# Patient Record
Sex: MALE | Race: WHITE | Employment: UNEMPLOYED | URBAN - METROPOLITAN AREA
[De-identification: names, ages, dates, MRNs, and addresses within clinical notes are randomized per-mention and may not be internally consistent; named-entity substitution may affect disease eponyms.]

---

## 2017-01-03 ENCOUNTER — HOSPITAL ENCOUNTER (OUTPATIENT)
Dept: CT IMAGING | Age: 57
Discharge: HOME | End: 2017-01-03
Attending: INTERNAL MEDICINE | Admitting: INTERNAL MEDICINE

## 2017-01-03 DIAGNOSIS — J18.9 COMMUNITY ACQUIRED PNEUMONIA: ICD-10-CM

## 2017-01-03 DIAGNOSIS — J18.9 PNEUMONIA: ICD-10-CM

## 2018-04-06 ENCOUNTER — HOSPITAL ENCOUNTER (OUTPATIENT)
Dept: CT IMAGING | Age: 58
Discharge: HOME | End: 2018-04-06
Attending: INTERNAL MEDICINE | Admitting: INTERNAL MEDICINE

## 2018-04-06 VITALS
HEART RATE: 73 BPM | BODY MASS INDEX: 19.6 KG/M2 | OXYGEN SATURATION: 98 % | DIASTOLIC BLOOD PRESSURE: 72 MMHG | TEMPERATURE: 98.1 F | WEIGHT: 140 LBS | HEIGHT: 71 IN | SYSTOLIC BLOOD PRESSURE: 146 MMHG | RESPIRATION RATE: 17 BRPM

## 2018-04-06 DIAGNOSIS — D61.818 PANCYTOPENIA (HCC): ICD-10-CM

## 2018-04-06 DIAGNOSIS — D61.818 OTHER PANCYTOPENIA (HCC): ICD-10-CM

## 2018-04-06 RX ORDER — MIDAZOLAM HYDROCHLORIDE 1 MG/ML
1 INJECTION INTRAMUSCULAR; INTRAVENOUS EVERY 5 MIN PRN
Status: DISCONTINUED | OUTPATIENT
Start: 2018-04-06 | End: 2018-04-07 | Stop reason: HOSPADM

## 2018-04-06 RX ORDER — FENTANYL CITRATE 50 UG/ML
50 INJECTION, SOLUTION INTRAMUSCULAR; INTRAVENOUS EVERY 5 MIN PRN
Status: DISCONTINUED | OUTPATIENT
Start: 2018-04-06 | End: 2018-04-07 | Stop reason: HOSPADM

## 2018-04-06 ASSESSMENT — PAIN SCALES - GENERAL
PAINLEVEL_OUTOF10: 0
PAINLEVEL_OUTOF10: 0

## 2018-04-06 ASSESSMENT — PAIN DESCRIPTION - PAIN TYPE: TYPE: SURGICAL PAIN

## 2018-11-20 ENCOUNTER — HOSPITAL ENCOUNTER (OUTPATIENT)
Dept: ULTRASOUND IMAGING | Age: 58
Discharge: HOME | End: 2018-11-20
Payer: COMMERCIAL

## 2018-11-20 DIAGNOSIS — B18.2 HEP C W/O COMA, CHRONIC (HCC): ICD-10-CM

## 2018-11-20 PROCEDURE — 76705 ECHO EXAM OF ABDOMEN: CPT

## 2019-10-30 ENCOUNTER — HOSPITAL ENCOUNTER (INPATIENT)
Age: 59
LOS: 1 days | Discharge: HOME | DRG: 143 | End: 2019-11-01
Attending: EMERGENCY MEDICINE | Admitting: INTERNAL MEDICINE
Payer: COMMERCIAL

## 2019-10-30 ENCOUNTER — APPOINTMENT (OUTPATIENT)
Dept: CT IMAGING | Age: 59
DRG: 143 | End: 2019-10-30
Payer: COMMERCIAL

## 2019-10-30 ENCOUNTER — APPOINTMENT (OUTPATIENT)
Dept: GENERAL RADIOLOGY | Age: 59
DRG: 143 | End: 2019-10-30
Payer: COMMERCIAL

## 2019-10-30 DIAGNOSIS — J90 PLEURAL EFFUSION ON RIGHT: Primary | ICD-10-CM

## 2019-10-30 DIAGNOSIS — D69.6 THROMBOCYTOPENIA (HCC): ICD-10-CM

## 2019-10-30 DIAGNOSIS — D72.819 LEUKOPENIA, UNSPECIFIED TYPE: ICD-10-CM

## 2019-10-30 LAB
ANION GAP SERPL CALCULATED.3IONS-SCNC: 8 MMOL/L (ref 3–16)
BASE EXCESS VENOUS: 1 (ref -3–3)
BASOPHILS ABSOLUTE: 0 K/UL (ref 0–0.2)
BASOPHILS RELATIVE PERCENT: 0.9 %
BUN BLDV-MCNC: 9 MG/DL (ref 7–20)
CALCIUM SERPL-MCNC: 8 MG/DL (ref 8.3–10.6)
CHLORIDE BLD-SCNC: 103 MMOL/L (ref 99–110)
CO2: 24 MMOL/L (ref 21–32)
CREAT SERPL-MCNC: 0.9 MG/DL (ref 0.9–1.3)
EOSINOPHILS ABSOLUTE: 0.1 K/UL (ref 0–0.6)
EOSINOPHILS RELATIVE PERCENT: 3.7 %
GFR AFRICAN AMERICAN: >60
GFR NON-AFRICAN AMERICAN: >60
GLUCOSE BLD-MCNC: 138 MG/DL (ref 70–99)
HCO3 VENOUS: 25.1 MMOL/L (ref 23–29)
HCT VFR BLD CALC: 32 % (ref 40.5–52.5)
HEMOGLOBIN: 10.8 G/DL (ref 13.5–17.5)
LACTATE: 0.93 MMOL/L (ref 0.4–2)
LYMPHOCYTES ABSOLUTE: 0.9 K/UL (ref 1–5.1)
LYMPHOCYTES RELATIVE PERCENT: 55 %
MCH RBC QN AUTO: 34.1 PG (ref 26–34)
MCHC RBC AUTO-ENTMCNC: 33.8 G/DL (ref 31–36)
MCV RBC AUTO: 101.1 FL (ref 80–100)
MONOCYTES ABSOLUTE: 0.3 K/UL (ref 0–1.3)
MONOCYTES RELATIVE PERCENT: 16.1 %
NEUTROPHILS ABSOLUTE: 0.4 K/UL (ref 1.7–7.7)
NEUTROPHILS RELATIVE PERCENT: 24.3 %
O2 SAT, VEN: 48 %
PCO2, VEN: 36.9 MM HG (ref 40–50)
PDW BLD-RTO: 15.7 % (ref 12.4–15.4)
PERFORMED ON: ABNORMAL
PH VENOUS: 7.44 (ref 7.35–7.45)
PLATELET # BLD: 72 K/UL (ref 135–450)
PMV BLD AUTO: 8.1 FL (ref 5–10.5)
PO2, VEN: 25 MM HG
POC SAMPLE TYPE: ABNORMAL
POTASSIUM SERPL-SCNC: 4.1 MMOL/L (ref 3.5–5.1)
PRO-BNP: 125 PG/ML (ref 0–124)
RAPID INFLUENZA  B AGN: NEGATIVE
RAPID INFLUENZA A AGN: NEGATIVE
RBC # BLD: 3.16 M/UL (ref 4.2–5.9)
SODIUM BLD-SCNC: 135 MMOL/L (ref 136–145)
TCO2 CALC VENOUS: 26 MMOL/L
TROPONIN: <0.01 NG/ML
WBC # BLD: 1.7 K/UL (ref 4–11)

## 2019-10-30 PROCEDURE — 82803 BLOOD GASES ANY COMBINATION: CPT

## 2019-10-30 PROCEDURE — 83880 ASSAY OF NATRIURETIC PEPTIDE: CPT

## 2019-10-30 PROCEDURE — 99285 EMERGENCY DEPT VISIT HI MDM: CPT

## 2019-10-30 PROCEDURE — 93005 ELECTROCARDIOGRAM TRACING: CPT | Performed by: EMERGENCY MEDICINE

## 2019-10-30 PROCEDURE — 71250 CT THORAX DX C-: CPT

## 2019-10-30 PROCEDURE — 80048 BASIC METABOLIC PNL TOTAL CA: CPT

## 2019-10-30 PROCEDURE — 83605 ASSAY OF LACTIC ACID: CPT

## 2019-10-30 PROCEDURE — 36415 COLL VENOUS BLD VENIPUNCTURE: CPT

## 2019-10-30 PROCEDURE — 80076 HEPATIC FUNCTION PANEL: CPT

## 2019-10-30 PROCEDURE — 87804 INFLUENZA ASSAY W/OPTIC: CPT

## 2019-10-30 PROCEDURE — 84484 ASSAY OF TROPONIN QUANT: CPT

## 2019-10-30 PROCEDURE — 85025 COMPLETE CBC W/AUTO DIFF WBC: CPT

## 2019-10-30 PROCEDURE — 71046 X-RAY EXAM CHEST 2 VIEWS: CPT

## 2019-10-30 ASSESSMENT — ENCOUNTER SYMPTOMS
EYES NEGATIVE: 1
COUGH: 1
GASTROINTESTINAL NEGATIVE: 1
SHORTNESS OF BREATH: 1

## 2019-10-31 ENCOUNTER — APPOINTMENT (OUTPATIENT)
Dept: GENERAL RADIOLOGY | Age: 59
DRG: 143 | End: 2019-10-31
Payer: COMMERCIAL

## 2019-10-31 PROBLEM — R06.02 SHORTNESS OF BREATH: Status: ACTIVE | Noted: 2019-10-31

## 2019-10-31 LAB
ALBUMIN SERPL-MCNC: 2.3 G/DL (ref 3.4–5)
ALP BLD-CCNC: 114 U/L (ref 40–129)
ALT SERPL-CCNC: 29 U/L (ref 10–40)
ANION GAP SERPL CALCULATED.3IONS-SCNC: 8 MMOL/L (ref 3–16)
APPEARANCE FLUID: CLEAR
AST SERPL-CCNC: 65 U/L (ref 15–37)
BASOPHILS ABSOLUTE: 0 K/UL (ref 0–0.2)
BASOPHILS RELATIVE PERCENT: 0.6 %
BILIRUB SERPL-MCNC: 1 MG/DL (ref 0–1)
BILIRUBIN DIRECT: 0.4 MG/DL (ref 0–0.3)
BILIRUBIN, INDIRECT: 0.6 MG/DL (ref 0–1)
BUN BLDV-MCNC: 9 MG/DL (ref 7–20)
CALCIUM SERPL-MCNC: 8.3 MG/DL (ref 8.3–10.6)
CELL COUNT FLUID TYPE: NORMAL
CHLORIDE BLD-SCNC: 105 MMOL/L (ref 99–110)
CLOT EVALUATION: NORMAL
CO2: 24 MMOL/L (ref 21–32)
COLOR FLUID: YELLOW
CREAT SERPL-MCNC: 0.9 MG/DL (ref 0.9–1.3)
EKG ATRIAL RATE: 84 BPM
EKG DIAGNOSIS: NORMAL
EKG P AXIS: 10 DEGREES
EKG P-R INTERVAL: 130 MS
EKG Q-T INTERVAL: 346 MS
EKG QRS DURATION: 74 MS
EKG QTC CALCULATION (BAZETT): 408 MS
EKG R AXIS: 20 DEGREES
EKG T AXIS: 14 DEGREES
EKG VENTRICULAR RATE: 84 BPM
EOSINOPHILS ABSOLUTE: 0 K/UL (ref 0–0.6)
EOSINOPHILS RELATIVE PERCENT: 2.8 %
FLUID PATH CONSULT: YES
FLUID TYPE: NORMAL
GFR AFRICAN AMERICAN: >60
GFR NON-AFRICAN AMERICAN: >60
GLUCOSE BLD-MCNC: 154 MG/DL (ref 70–99)
GLUCOSE, FLUID: 154 MG/DL
HCT VFR BLD CALC: 31.3 % (ref 40.5–52.5)
HCT VFR BLD CALC: 33.3 % (ref 40.5–52.5)
HEMOGLOBIN: 11.3 G/DL (ref 13.5–17.5)
IMMATURE RETIC FRACT: 0.46 (ref 0.21–0.37)
LACTATE DEHYDROGENASE: 235 U/L (ref 100–190)
LD, FLUID: 75 U/L
LV EF: 65 %
LVEF MODALITY: NORMAL
LYMPHOCYTES ABSOLUTE: 0.9 K/UL (ref 1–5.1)
LYMPHOCYTES RELATIVE PERCENT: 57.3 %
LYMPHOCYTES, BODY FLUID: 15 %
MCH RBC QN AUTO: 34.3 PG (ref 26–34)
MCHC RBC AUTO-ENTMCNC: 33.9 G/DL (ref 31–36)
MCV RBC AUTO: 101.2 FL (ref 80–100)
MESOTHELIAL FLUID: 29 %
MONOCYTE, FLUID: 52 %
MONOCYTES ABSOLUTE: 0.2 K/UL (ref 0–1.3)
MONOCYTES RELATIVE PERCENT: 12.3 %
NEUTROPHIL, FLUID: 4 %
NEUTROPHILS ABSOLUTE: 0.4 K/UL (ref 1.7–7.7)
NEUTROPHILS RELATIVE PERCENT: 27 %
NUCLEATED CELLS FLUID: 253 /CUMM
NUMBER OF CELLS COUNTED FLUID: 100
PDW BLD-RTO: 15.9 % (ref 12.4–15.4)
PLATELET # BLD: 71 K/UL (ref 135–450)
PMV BLD AUTO: 8.2 FL (ref 5–10.5)
POTASSIUM REFLEX MAGNESIUM: 4.1 MMOL/L (ref 3.5–5.1)
PROTEIN FLUID: 1.6 G/DL
RBC # BLD: 3.29 M/UL (ref 4.2–5.9)
RBC FLUID: 100 /CUMM
RETICULOCYTE ABSOLUTE COUNT: 0.05 M/UL
RETICULOCYTE COUNT PCT: 1.62 % (ref 0.5–2.18)
SODIUM BLD-SCNC: 137 MMOL/L (ref 136–145)
TOTAL PROTEIN: 6.7 G/DL (ref 6.4–8.2)
WBC # BLD: 1.5 K/UL (ref 4–11)

## 2019-10-31 PROCEDURE — 96374 THER/PROPH/DIAG INJ IV PUSH: CPT

## 2019-10-31 PROCEDURE — 85025 COMPLETE CBC W/AUTO DIFF WBC: CPT

## 2019-10-31 PROCEDURE — 84157 ASSAY OF PROTEIN OTHER: CPT

## 2019-10-31 PROCEDURE — 88112 CYTOPATH CELL ENHANCE TECH: CPT

## 2019-10-31 PROCEDURE — 80048 BASIC METABOLIC PNL TOTAL CA: CPT

## 2019-10-31 PROCEDURE — 82747 ASSAY OF FOLIC ACID RBC: CPT

## 2019-10-31 PROCEDURE — 2580000003 HC RX 258: Performed by: INTERNAL MEDICINE

## 2019-10-31 PROCEDURE — 0W993ZZ DRAINAGE OF RIGHT PLEURAL CAVITY, PERCUTANEOUS APPROACH: ICD-10-PCS | Performed by: INTERNAL MEDICINE

## 2019-10-31 PROCEDURE — 6370000000 HC RX 637 (ALT 250 FOR IP): Performed by: INTERNAL MEDICINE

## 2019-10-31 PROCEDURE — 2580000003 HC RX 258: Performed by: EMERGENCY MEDICINE

## 2019-10-31 PROCEDURE — 1200000000 HC SEMI PRIVATE

## 2019-10-31 PROCEDURE — 36415 COLL VENOUS BLD VENIPUNCTURE: CPT

## 2019-10-31 PROCEDURE — 85045 AUTOMATED RETICULOCYTE COUNT: CPT

## 2019-10-31 PROCEDURE — 32557 INSERT CATH PLEURA W/ IMAGE: CPT | Performed by: INTERNAL MEDICINE

## 2019-10-31 PROCEDURE — 71045 X-RAY EXAM CHEST 1 VIEW: CPT

## 2019-10-31 PROCEDURE — 99223 1ST HOSP IP/OBS HIGH 75: CPT | Performed by: INTERNAL MEDICINE

## 2019-10-31 PROCEDURE — 6360000002 HC RX W HCPCS: Performed by: INTERNAL MEDICINE

## 2019-10-31 PROCEDURE — 83921 ORGANIC ACID SINGLE QUANT: CPT

## 2019-10-31 PROCEDURE — 82945 GLUCOSE OTHER FLUID: CPT

## 2019-10-31 PROCEDURE — 93306 TTE W/DOPPLER COMPLETE: CPT

## 2019-10-31 PROCEDURE — 88305 TISSUE EXAM BY PATHOLOGIST: CPT

## 2019-10-31 PROCEDURE — 6360000002 HC RX W HCPCS: Performed by: EMERGENCY MEDICINE

## 2019-10-31 PROCEDURE — 89051 BODY FLUID CELL COUNT: CPT

## 2019-10-31 PROCEDURE — 87040 BLOOD CULTURE FOR BACTERIA: CPT

## 2019-10-31 PROCEDURE — 83615 LACTATE (LD) (LDH) ENZYME: CPT

## 2019-10-31 PROCEDURE — 6370000000 HC RX 637 (ALT 250 FOR IP): Performed by: EMERGENCY MEDICINE

## 2019-10-31 PROCEDURE — 94640 AIRWAY INHALATION TREATMENT: CPT

## 2019-10-31 RX ORDER — ONDANSETRON 2 MG/ML
4 INJECTION INTRAMUSCULAR; INTRAVENOUS EVERY 6 HOURS PRN
Status: DISCONTINUED | OUTPATIENT
Start: 2019-10-31 | End: 2019-11-01 | Stop reason: HOSPADM

## 2019-10-31 RX ORDER — METHADONE HYDROCHLORIDE 10 MG/1
10 TABLET ORAL 2 TIMES DAILY
Status: DISCONTINUED | OUTPATIENT
Start: 2019-10-31 | End: 2019-11-01 | Stop reason: HOSPADM

## 2019-10-31 RX ORDER — ASPIRIN 81 MG/1
81 TABLET, CHEWABLE ORAL DAILY
Status: DISCONTINUED | OUTPATIENT
Start: 2019-10-31 | End: 2019-11-01 | Stop reason: HOSPADM

## 2019-10-31 RX ORDER — ALBUTEROL SULFATE 2.5 MG/3ML
2.5 SOLUTION RESPIRATORY (INHALATION) EVERY 4 HOURS PRN
Status: DISCONTINUED | OUTPATIENT
Start: 2019-10-31 | End: 2019-11-01 | Stop reason: HOSPADM

## 2019-10-31 RX ORDER — LISINOPRIL 40 MG/1
40 TABLET ORAL DAILY
Status: DISCONTINUED | OUTPATIENT
Start: 2019-10-31 | End: 2019-11-01 | Stop reason: HOSPADM

## 2019-10-31 RX ORDER — METHADONE HYDROCHLORIDE 10 MG/1
10 TABLET ORAL ONCE
Status: COMPLETED | OUTPATIENT
Start: 2019-10-31 | End: 2019-10-31

## 2019-10-31 RX ORDER — SENNA AND DOCUSATE SODIUM 50; 8.6 MG/1; MG/1
1 TABLET, FILM COATED ORAL DAILY
Status: DISCONTINUED | OUTPATIENT
Start: 2019-10-31 | End: 2019-11-01 | Stop reason: HOSPADM

## 2019-10-31 RX ORDER — OXYCODONE AND ACETAMINOPHEN 10; 325 MG/1; MG/1
1 TABLET ORAL EVERY 8 HOURS PRN
Status: DISCONTINUED | OUTPATIENT
Start: 2019-10-31 | End: 2019-11-01 | Stop reason: HOSPADM

## 2019-10-31 RX ORDER — GABAPENTIN 300 MG/1
300 CAPSULE ORAL 2 TIMES DAILY
Status: DISCONTINUED | OUTPATIENT
Start: 2019-10-31 | End: 2019-11-01 | Stop reason: HOSPADM

## 2019-10-31 RX ORDER — SODIUM CHLORIDE 0.9 % (FLUSH) 0.9 %
10 SYRINGE (ML) INJECTION EVERY 12 HOURS SCHEDULED
Status: DISCONTINUED | OUTPATIENT
Start: 2019-10-31 | End: 2019-11-01 | Stop reason: HOSPADM

## 2019-10-31 RX ORDER — ALBUTEROL SULFATE 2.5 MG/3ML
2.5 SOLUTION RESPIRATORY (INHALATION)
Status: DISCONTINUED | OUTPATIENT
Start: 2019-10-31 | End: 2019-10-31

## 2019-10-31 RX ORDER — OXYCODONE HYDROCHLORIDE 5 MG/1
10 TABLET ORAL ONCE
Status: COMPLETED | OUTPATIENT
Start: 2019-10-31 | End: 2019-10-31

## 2019-10-31 RX ORDER — SODIUM CHLORIDE 0.9 % (FLUSH) 0.9 %
10 SYRINGE (ML) INJECTION PRN
Status: DISCONTINUED | OUTPATIENT
Start: 2019-10-31 | End: 2019-11-01 | Stop reason: HOSPADM

## 2019-10-31 RX ORDER — IPRATROPIUM BROMIDE AND ALBUTEROL SULFATE 2.5; .5 MG/3ML; MG/3ML
1 SOLUTION RESPIRATORY (INHALATION)
Status: DISCONTINUED | OUTPATIENT
Start: 2019-10-31 | End: 2019-10-31

## 2019-10-31 RX ORDER — AMLODIPINE BESYLATE 2.5 MG/1
2.5 TABLET ORAL DAILY
Status: DISCONTINUED | OUTPATIENT
Start: 2019-10-31 | End: 2019-11-01 | Stop reason: HOSPADM

## 2019-10-31 RX ADMIN — METHADONE HYDROCHLORIDE 10 MG: 10 TABLET ORAL at 08:49

## 2019-10-31 RX ADMIN — METHADONE HYDROCHLORIDE 10 MG: 10 TABLET ORAL at 20:04

## 2019-10-31 RX ADMIN — AZITHROMYCIN MONOHYDRATE 500 MG: 500 INJECTION, POWDER, LYOPHILIZED, FOR SOLUTION INTRAVENOUS at 02:12

## 2019-10-31 RX ADMIN — GABAPENTIN 300 MG: 300 CAPSULE ORAL at 08:49

## 2019-10-31 RX ADMIN — OXYCODONE HYDROCHLORIDE AND ACETAMINOPHEN 1 TABLET: 10; 325 TABLET ORAL at 14:35

## 2019-10-31 RX ADMIN — GABAPENTIN 300 MG: 300 CAPSULE ORAL at 20:04

## 2019-10-31 RX ADMIN — AMLODIPINE BESYLATE 2.5 MG: 2.5 TABLET ORAL at 08:49

## 2019-10-31 RX ADMIN — ALBUTEROL SULFATE 2.5 MG: 2.5 SOLUTION RESPIRATORY (INHALATION) at 03:50

## 2019-10-31 RX ADMIN — LISINOPRIL 40 MG: 40 TABLET ORAL at 08:49

## 2019-10-31 RX ADMIN — ASPIRIN 81 MG 81 MG: 81 TABLET ORAL at 08:49

## 2019-10-31 RX ADMIN — SENNOSIDES-DOCUSATE SODIUM TAB 8.6-50 MG 1 TABLET: 8.6-5 TAB at 08:49

## 2019-10-31 RX ADMIN — Medication 10 ML: at 20:05

## 2019-10-31 RX ADMIN — METHADONE HYDROCHLORIDE 10 MG: 10 TABLET ORAL at 01:08

## 2019-10-31 RX ADMIN — CEFTRIAXONE 1 G: 1 INJECTION, POWDER, FOR SOLUTION INTRAMUSCULAR; INTRAVENOUS at 01:37

## 2019-10-31 RX ADMIN — Medication 10 ML: at 08:49

## 2019-10-31 RX ADMIN — OXYCODONE HYDROCHLORIDE 10 MG: 5 TABLET ORAL at 00:18

## 2019-10-31 ASSESSMENT — PAIN - FUNCTIONAL ASSESSMENT
PAIN_FUNCTIONAL_ASSESSMENT: PREVENTS OR INTERFERES SOME ACTIVE ACTIVITIES AND ADLS
PAIN_FUNCTIONAL_ASSESSMENT: PREVENTS OR INTERFERES SOME ACTIVE ACTIVITIES AND ADLS

## 2019-10-31 ASSESSMENT — PAIN DESCRIPTION - ONSET
ONSET: ON-GOING

## 2019-10-31 ASSESSMENT — PAIN SCALES - GENERAL
PAINLEVEL_OUTOF10: 6
PAINLEVEL_OUTOF10: 4
PAINLEVEL_OUTOF10: 7
PAINLEVEL_OUTOF10: 5
PAINLEVEL_OUTOF10: 5
PAINLEVEL_OUTOF10: 6
PAINLEVEL_OUTOF10: 6
PAINLEVEL_OUTOF10: 4

## 2019-10-31 ASSESSMENT — PAIN DESCRIPTION - FREQUENCY
FREQUENCY: CONTINUOUS

## 2019-10-31 ASSESSMENT — PAIN DESCRIPTION - PAIN TYPE
TYPE: CHRONIC PAIN

## 2019-10-31 ASSESSMENT — ENCOUNTER SYMPTOMS
SHORTNESS OF BREATH: 1
COUGH: 1
CHEST TIGHTNESS: 0
WHEEZING: 0

## 2019-10-31 ASSESSMENT — PAIN DESCRIPTION - LOCATION
LOCATION: BACK
LOCATION: BACK;LEG
LOCATION: BACK;LEG
LOCATION: LEG

## 2019-10-31 ASSESSMENT — PAIN DESCRIPTION - ORIENTATION
ORIENTATION: RIGHT;LEFT
ORIENTATION: LOWER
ORIENTATION: LOWER;LEFT
ORIENTATION: RIGHT;LEFT

## 2019-10-31 ASSESSMENT — PAIN DESCRIPTION - PROGRESSION
CLINICAL_PROGRESSION: NOT CHANGED

## 2019-10-31 ASSESSMENT — PAIN DESCRIPTION - DESCRIPTORS
DESCRIPTORS: ACHING;SHARP
DESCRIPTORS: ACHING

## 2019-11-01 VITALS
WEIGHT: 169.75 LBS | TEMPERATURE: 97.1 F | RESPIRATION RATE: 18 BRPM | SYSTOLIC BLOOD PRESSURE: 110 MMHG | HEART RATE: 64 BPM | BODY MASS INDEX: 23.77 KG/M2 | HEIGHT: 71 IN | OXYGEN SATURATION: 95 % | DIASTOLIC BLOOD PRESSURE: 65 MMHG

## 2019-11-01 DIAGNOSIS — R91.8 GROUND GLASS OPACITY PRESENT ON IMAGING OF LUNG: Primary | ICD-10-CM

## 2019-11-01 PROBLEM — K74.60 CIRRHOSIS (HCC): Status: ACTIVE | Noted: 2019-11-01

## 2019-11-01 PROBLEM — J90 PLEURAL EFFUSION: Status: ACTIVE | Noted: 2019-11-01

## 2019-11-01 LAB
ALBUMIN SERPL-MCNC: 2 G/DL (ref 3.4–5)
ALP BLD-CCNC: 94 U/L (ref 40–129)
ALT SERPL-CCNC: 25 U/L (ref 10–40)
ANION GAP SERPL CALCULATED.3IONS-SCNC: 4 MMOL/L (ref 3–16)
AST SERPL-CCNC: 52 U/L (ref 15–37)
ATYPICAL LYMPHOCYTE RELATIVE PERCENT: 4 % (ref 0–6)
BANDED NEUTROPHILS RELATIVE PERCENT: 1 % (ref 0–7)
BASOPHILS ABSOLUTE: 0 K/UL (ref 0–0.2)
BASOPHILS RELATIVE PERCENT: 1 %
BILIRUB SERPL-MCNC: 0.8 MG/DL (ref 0–1)
BILIRUBIN DIRECT: 0.3 MG/DL (ref 0–0.3)
BILIRUBIN, INDIRECT: 0.5 MG/DL (ref 0–1)
BUN BLDV-MCNC: 11 MG/DL (ref 7–20)
CALCIUM SERPL-MCNC: 7.8 MG/DL (ref 8.3–10.6)
CHLORIDE BLD-SCNC: 108 MMOL/L (ref 99–110)
CO2: 26 MMOL/L (ref 21–32)
CREAT SERPL-MCNC: 0.9 MG/DL (ref 0.9–1.3)
EOSINOPHILS ABSOLUTE: 0.1 K/UL (ref 0–0.6)
EOSINOPHILS RELATIVE PERCENT: 5 %
GFR AFRICAN AMERICAN: >60
GFR NON-AFRICAN AMERICAN: >60
GLUCOSE BLD-MCNC: 82 MG/DL (ref 70–99)
HCT VFR BLD CALC: 30.6 % (ref 40.5–52.5)
HCT VFR BLD CALC: 37.4 %
HEMOGLOBIN: 10.1 G/DL (ref 13.5–17.5)
LYMPHOCYTES ABSOLUTE: 0.8 K/UL (ref 1–5.1)
LYMPHOCYTES RELATIVE PERCENT: 47 %
MCH RBC QN AUTO: 33.4 PG (ref 26–34)
MCHC RBC AUTO-ENTMCNC: 33 G/DL (ref 31–36)
MCV RBC AUTO: 101.3 FL (ref 80–100)
MONOCYTES ABSOLUTE: 0.2 K/UL (ref 0–1.3)
MONOCYTES RELATIVE PERCENT: 12 %
NEUTROPHILS ABSOLUTE: 0.5 K/UL (ref 1.7–7.7)
NEUTROPHILS RELATIVE PERCENT: 30 %
PDW BLD-RTO: 16 % (ref 12.4–15.4)
PLATELET # BLD: 68 K/UL (ref 135–450)
PLATELET SLIDE REVIEW: ABNORMAL
PMV BLD AUTO: 8.2 FL (ref 5–10.5)
POTASSIUM REFLEX MAGNESIUM: 4.2 MMOL/L (ref 3.5–5.1)
RBC # BLD: 3.02 M/UL (ref 4.2–5.9)
RBC FOLATE: 963 NG/ML
SODIUM BLD-SCNC: 138 MMOL/L (ref 136–145)
TOTAL PROTEIN: 6 G/DL (ref 6.4–8.2)
WBC # BLD: 1.6 K/UL (ref 4–11)

## 2019-11-01 PROCEDURE — 80076 HEPATIC FUNCTION PANEL: CPT

## 2019-11-01 PROCEDURE — 6370000000 HC RX 637 (ALT 250 FOR IP): Performed by: INTERNAL MEDICINE

## 2019-11-01 PROCEDURE — 85025 COMPLETE CBC W/AUTO DIFF WBC: CPT

## 2019-11-01 PROCEDURE — 80048 BASIC METABOLIC PNL TOTAL CA: CPT

## 2019-11-01 PROCEDURE — 2580000003 HC RX 258: Performed by: INTERNAL MEDICINE

## 2019-11-01 PROCEDURE — 6360000002 HC RX W HCPCS: Performed by: INTERNAL MEDICINE

## 2019-11-01 PROCEDURE — 36415 COLL VENOUS BLD VENIPUNCTURE: CPT

## 2019-11-01 PROCEDURE — 99233 SBSQ HOSP IP/OBS HIGH 50: CPT | Performed by: INTERNAL MEDICINE

## 2019-11-01 RX ORDER — SPIRONOLACTONE 100 MG/1
100 TABLET, FILM COATED ORAL DAILY
Qty: 30 TABLET | Refills: 3 | Status: ON HOLD | OUTPATIENT
Start: 2019-11-02 | End: 2019-12-04

## 2019-11-01 RX ORDER — FUROSEMIDE 40 MG/1
40 TABLET ORAL DAILY
Qty: 60 TABLET | Refills: 3 | Status: ON HOLD | OUTPATIENT
Start: 2019-11-02 | End: 2019-12-04

## 2019-11-01 RX ORDER — SPIRONOLACTONE 100 MG/1
100 TABLET, FILM COATED ORAL DAILY
Status: DISCONTINUED | OUTPATIENT
Start: 2019-11-01 | End: 2019-11-01 | Stop reason: HOSPADM

## 2019-11-01 RX ORDER — FUROSEMIDE 40 MG/1
40 TABLET ORAL DAILY
Status: DISCONTINUED | OUTPATIENT
Start: 2019-11-01 | End: 2019-11-01 | Stop reason: HOSPADM

## 2019-11-01 RX ADMIN — AMLODIPINE BESYLATE 2.5 MG: 2.5 TABLET ORAL at 08:20

## 2019-11-01 RX ADMIN — FUROSEMIDE 40 MG: 40 TABLET ORAL at 11:20

## 2019-11-01 RX ADMIN — CEFTRIAXONE 1 G: 1 INJECTION, POWDER, FOR SOLUTION INTRAMUSCULAR; INTRAVENOUS at 01:23

## 2019-11-01 RX ADMIN — ASPIRIN 81 MG 81 MG: 81 TABLET ORAL at 08:20

## 2019-11-01 RX ADMIN — SPIRONOLACTONE 100 MG: 100 TABLET, FILM COATED ORAL at 11:20

## 2019-11-01 RX ADMIN — OXYCODONE HYDROCHLORIDE AND ACETAMINOPHEN 1 TABLET: 10; 325 TABLET ORAL at 02:37

## 2019-11-01 RX ADMIN — SENNOSIDES-DOCUSATE SODIUM TAB 8.6-50 MG 1 TABLET: 8.6-5 TAB at 08:20

## 2019-11-01 RX ADMIN — METHADONE HYDROCHLORIDE 10 MG: 10 TABLET ORAL at 08:20

## 2019-11-01 RX ADMIN — LISINOPRIL 40 MG: 40 TABLET ORAL at 08:20

## 2019-11-01 RX ADMIN — GABAPENTIN 300 MG: 300 CAPSULE ORAL at 08:20

## 2019-11-01 RX ADMIN — Medication 10 ML: at 08:21

## 2019-11-01 RX ADMIN — AZITHROMYCIN 500 MG: 500 INJECTION, POWDER, LYOPHILIZED, FOR SOLUTION INTRAVENOUS at 02:00

## 2019-11-01 ASSESSMENT — PAIN DESCRIPTION - DESCRIPTORS: DESCRIPTORS: ACHING

## 2019-11-01 ASSESSMENT — PAIN DESCRIPTION - LOCATION: LOCATION: BACK;LEG

## 2019-11-01 ASSESSMENT — PAIN DESCRIPTION - PROGRESSION: CLINICAL_PROGRESSION: NOT CHANGED

## 2019-11-01 ASSESSMENT — PAIN SCALES - GENERAL
PAINLEVEL_OUTOF10: 6
PAINLEVEL_OUTOF10: 7
PAINLEVEL_OUTOF10: 6
PAINLEVEL_OUTOF10: 3
PAINLEVEL_OUTOF10: 2

## 2019-11-01 ASSESSMENT — PAIN DESCRIPTION - ONSET: ONSET: ON-GOING

## 2019-11-01 ASSESSMENT — PAIN DESCRIPTION - FREQUENCY: FREQUENCY: CONTINUOUS

## 2019-11-01 ASSESSMENT — PAIN DESCRIPTION - ORIENTATION: ORIENTATION: RIGHT;LEFT

## 2019-11-01 ASSESSMENT — PAIN DESCRIPTION - PAIN TYPE: TYPE: CHRONIC PAIN

## 2019-11-03 LAB — METHYLMALONIC ACID: 0.19 UMOL/L (ref 0–0.4)

## 2019-11-05 LAB
BLOOD CULTURE, ROUTINE: NORMAL
CULTURE, BLOOD 2: NORMAL

## 2019-11-06 ENCOUNTER — TELEPHONE (OUTPATIENT)
Dept: PULMONOLOGY | Age: 59
End: 2019-11-06

## 2019-12-04 ENCOUNTER — HOSPITAL ENCOUNTER (INPATIENT)
Age: 59
LOS: 2 days | Discharge: HOME | DRG: 283 | End: 2019-12-06
Attending: EMERGENCY MEDICINE | Admitting: INTERNAL MEDICINE
Payer: COMMERCIAL

## 2019-12-04 ENCOUNTER — APPOINTMENT (OUTPATIENT)
Dept: GENERAL RADIOLOGY | Age: 59
DRG: 283 | End: 2019-12-04
Payer: COMMERCIAL

## 2019-12-04 DIAGNOSIS — R06.00 DYSPNEA, UNSPECIFIED TYPE: ICD-10-CM

## 2019-12-04 DIAGNOSIS — R53.1 GENERAL WEAKNESS: ICD-10-CM

## 2019-12-04 DIAGNOSIS — E87.1 HYPONATREMIA: Primary | ICD-10-CM

## 2019-12-04 LAB
ALBUMIN SERPL-MCNC: 3.2 G/DL (ref 3.4–5)
ALBUMIN SERPL-MCNC: 3.2 G/DL (ref 3.4–5)
ALP BLD-CCNC: 99 U/L (ref 40–129)
ALT SERPL-CCNC: 51 U/L (ref 10–40)
ANION GAP SERPL CALCULATED.3IONS-SCNC: 11 MMOL/L (ref 3–16)
ANION GAP SERPL CALCULATED.3IONS-SCNC: 11 MMOL/L (ref 3–16)
AST SERPL-CCNC: 96 U/L (ref 15–37)
BASOPHILS ABSOLUTE: 0 K/UL (ref 0–0.2)
BASOPHILS RELATIVE PERCENT: 0.8 %
BILIRUB SERPL-MCNC: 1.2 MG/DL (ref 0–1)
BILIRUBIN DIRECT: 0.3 MG/DL (ref 0–0.3)
BILIRUBIN URINE: NEGATIVE
BILIRUBIN, INDIRECT: 0.9 MG/DL (ref 0–1)
BLOOD, URINE: NEGATIVE
BUN BLDV-MCNC: 46 MG/DL (ref 7–20)
BUN BLDV-MCNC: 48 MG/DL (ref 7–20)
CALCIUM SERPL-MCNC: 9.2 MG/DL (ref 8.3–10.6)
CALCIUM SERPL-MCNC: 9.3 MG/DL (ref 8.3–10.6)
CHLORIDE BLD-SCNC: 91 MMOL/L (ref 99–110)
CHLORIDE BLD-SCNC: 92 MMOL/L (ref 99–110)
CLARITY: CLEAR
CO2: 21 MMOL/L (ref 21–32)
CO2: 21 MMOL/L (ref 21–32)
COLOR: YELLOW
CREAT SERPL-MCNC: 1.4 MG/DL (ref 0.9–1.3)
CREAT SERPL-MCNC: 1.6 MG/DL (ref 0.9–1.3)
EKG ATRIAL RATE: 91 BPM
EKG DIAGNOSIS: NORMAL
EKG P AXIS: 57 DEGREES
EKG P-R INTERVAL: 156 MS
EKG Q-T INTERVAL: 336 MS
EKG QRS DURATION: 80 MS
EKG QTC CALCULATION (BAZETT): 413 MS
EKG R AXIS: 57 DEGREES
EKG T AXIS: 59 DEGREES
EKG VENTRICULAR RATE: 91 BPM
EOSINOPHILS ABSOLUTE: 0 K/UL (ref 0–0.6)
EOSINOPHILS RELATIVE PERCENT: 1.6 %
GFR AFRICAN AMERICAN: 54
GFR AFRICAN AMERICAN: >60
GFR NON-AFRICAN AMERICAN: 45
GFR NON-AFRICAN AMERICAN: 52
GLUCOSE BLD-MCNC: 164 MG/DL (ref 70–99)
GLUCOSE BLD-MCNC: 194 MG/DL (ref 70–99)
GLUCOSE BLD-MCNC: 237 MG/DL (ref 70–99)
GLUCOSE URINE: NEGATIVE MG/DL
HCT VFR BLD CALC: 36.1 % (ref 40.5–52.5)
HEMOGLOBIN: 12.2 G/DL (ref 13.5–17.5)
KETONES, URINE: NEGATIVE MG/DL
LEUKOCYTE ESTERASE, URINE: NEGATIVE
LIPASE: 168 U/L (ref 13–60)
LYMPHOCYTES ABSOLUTE: 1.1 K/UL (ref 1–5.1)
LYMPHOCYTES RELATIVE PERCENT: 39.4 %
MCH RBC QN AUTO: 33.7 PG (ref 26–34)
MCHC RBC AUTO-ENTMCNC: 33.7 G/DL (ref 31–36)
MCV RBC AUTO: 100 FL (ref 80–100)
MICROSCOPIC EXAMINATION: NORMAL
MONOCYTES ABSOLUTE: 0.4 K/UL (ref 0–1.3)
MONOCYTES RELATIVE PERCENT: 12.4 %
NEUTROPHILS ABSOLUTE: 1.3 K/UL (ref 1.7–7.7)
NEUTROPHILS RELATIVE PERCENT: 45.8 %
NITRITE, URINE: NEGATIVE
OSMOLALITY: 285 MOSM/KG (ref 278–305)
PDW BLD-RTO: 14.9 % (ref 12.4–15.4)
PERFORMED ON: ABNORMAL
PH UA: 6 (ref 5–8)
PHOSPHORUS: 3.9 MG/DL (ref 2.5–4.9)
PLATELET # BLD: 99 K/UL (ref 135–450)
PMV BLD AUTO: 8.7 FL (ref 5–10.5)
POTASSIUM REFLEX MAGNESIUM: 5.2 MMOL/L (ref 3.5–5.1)
POTASSIUM SERPL-SCNC: 4.9 MMOL/L (ref 3.5–5.1)
POTASSIUM SERPL-SCNC: 6 MMOL/L (ref 3.5–5.1)
PROTEIN UA: NEGATIVE MG/DL
RBC # BLD: 3.61 M/UL (ref 4.2–5.9)
SODIUM BLD-SCNC: 123 MMOL/L (ref 136–145)
SODIUM BLD-SCNC: 124 MMOL/L (ref 136–145)
SPECIFIC GRAVITY UA: 1.01 (ref 1–1.03)
TOTAL PROTEIN: 8 G/DL (ref 6.4–8.2)
URIC ACID, SERUM: 9.1 MG/DL (ref 3.5–7.2)
URINE TYPE: NORMAL
UROBILINOGEN, URINE: 0.2 E.U./DL
WBC # BLD: 2.9 K/UL (ref 4–11)

## 2019-12-04 PROCEDURE — 6360000002 HC RX W HCPCS: Performed by: STUDENT IN AN ORGANIZED HEALTH CARE EDUCATION/TRAINING PROGRAM

## 2019-12-04 PROCEDURE — 80069 RENAL FUNCTION PANEL: CPT

## 2019-12-04 PROCEDURE — 84550 ASSAY OF BLOOD/URIC ACID: CPT

## 2019-12-04 PROCEDURE — 6370000000 HC RX 637 (ALT 250 FOR IP): Performed by: EMERGENCY MEDICINE

## 2019-12-04 PROCEDURE — 84132 ASSAY OF SERUM POTASSIUM: CPT

## 2019-12-04 PROCEDURE — 36415 COLL VENOUS BLD VENIPUNCTURE: CPT

## 2019-12-04 PROCEDURE — 93005 ELECTROCARDIOGRAM TRACING: CPT | Performed by: EMERGENCY MEDICINE

## 2019-12-04 PROCEDURE — 83690 ASSAY OF LIPASE: CPT

## 2019-12-04 PROCEDURE — 6370000000 HC RX 637 (ALT 250 FOR IP): Performed by: STUDENT IN AN ORGANIZED HEALTH CARE EDUCATION/TRAINING PROGRAM

## 2019-12-04 PROCEDURE — 99285 EMERGENCY DEPT VISIT HI MDM: CPT

## 2019-12-04 PROCEDURE — 2580000003 HC RX 258: Performed by: EMERGENCY MEDICINE

## 2019-12-04 PROCEDURE — 94150 VITAL CAPACITY TEST: CPT

## 2019-12-04 PROCEDURE — 83930 ASSAY OF BLOOD OSMOLALITY: CPT

## 2019-12-04 PROCEDURE — 80076 HEPATIC FUNCTION PANEL: CPT

## 2019-12-04 PROCEDURE — 81003 URINALYSIS AUTO W/O SCOPE: CPT

## 2019-12-04 PROCEDURE — 2580000003 HC RX 258: Performed by: STUDENT IN AN ORGANIZED HEALTH CARE EDUCATION/TRAINING PROGRAM

## 2019-12-04 PROCEDURE — 2060000000 HC ICU INTERMEDIATE R&B

## 2019-12-04 PROCEDURE — 71046 X-RAY EXAM CHEST 2 VIEWS: CPT

## 2019-12-04 PROCEDURE — 85025 COMPLETE CBC W/AUTO DIFF WBC: CPT

## 2019-12-04 PROCEDURE — 6370000000 HC RX 637 (ALT 250 FOR IP): Performed by: INTERNAL MEDICINE

## 2019-12-04 PROCEDURE — 93010 ELECTROCARDIOGRAM REPORT: CPT | Performed by: INTERNAL MEDICINE

## 2019-12-04 RX ORDER — HYDROXYZINE HYDROCHLORIDE 25 MG/1
25 TABLET, FILM COATED ORAL EVERY 6 HOURS PRN
COMMUNITY

## 2019-12-04 RX ORDER — ALBUTEROL SULFATE 2.5 MG/3ML
2.5 SOLUTION RESPIRATORY (INHALATION) EVERY 6 HOURS PRN
Status: DISCONTINUED | OUTPATIENT
Start: 2019-12-04 | End: 2019-12-05

## 2019-12-04 RX ORDER — GABAPENTIN 300 MG/1
300 CAPSULE ORAL 2 TIMES DAILY
Status: DISCONTINUED | OUTPATIENT
Start: 2019-12-04 | End: 2019-12-04

## 2019-12-04 RX ORDER — GABAPENTIN 300 MG/1
300 CAPSULE ORAL 2 TIMES DAILY
Status: CANCELLED | OUTPATIENT
Start: 2019-12-04

## 2019-12-04 RX ORDER — LORAZEPAM 1 MG/1
1 TABLET ORAL ONCE
Status: COMPLETED | OUTPATIENT
Start: 2019-12-04 | End: 2019-12-04

## 2019-12-04 RX ORDER — PROPRANOLOL HYDROCHLORIDE 10 MG/1
10 TABLET ORAL 3 TIMES DAILY
COMMUNITY

## 2019-12-04 RX ORDER — SODIUM CHLORIDE 0.9 % (FLUSH) 0.9 %
10 SYRINGE (ML) INJECTION PRN
Status: DISCONTINUED | OUTPATIENT
Start: 2019-12-04 | End: 2019-12-06 | Stop reason: HOSPADM

## 2019-12-04 RX ORDER — DEXTROSE MONOHYDRATE 50 MG/ML
100 INJECTION, SOLUTION INTRAVENOUS PRN
Status: DISCONTINUED | OUTPATIENT
Start: 2019-12-04 | End: 2019-12-06 | Stop reason: HOSPADM

## 2019-12-04 RX ORDER — METHADONE HYDROCHLORIDE 10 MG/1
10 TABLET ORAL 2 TIMES DAILY
Status: CANCELLED | OUTPATIENT
Start: 2019-12-04

## 2019-12-04 RX ORDER — INSULIN LISPRO 100 [IU]/ML
0-6 INJECTION, SOLUTION INTRAVENOUS; SUBCUTANEOUS
Status: DISCONTINUED | OUTPATIENT
Start: 2019-12-05 | End: 2019-12-06 | Stop reason: HOSPADM

## 2019-12-04 RX ORDER — SODIUM CHLORIDE 0.9 % (FLUSH) 0.9 %
10 SYRINGE (ML) INJECTION EVERY 12 HOURS SCHEDULED
Status: DISCONTINUED | OUTPATIENT
Start: 2019-12-04 | End: 2019-12-06 | Stop reason: HOSPADM

## 2019-12-04 RX ORDER — SODIUM CHLORIDE 9 MG/ML
INJECTION, SOLUTION INTRAVENOUS CONTINUOUS
Status: DISCONTINUED | OUTPATIENT
Start: 2019-12-04 | End: 2019-12-05

## 2019-12-04 RX ORDER — ERGOCALCIFEROL (VITAMIN D2) 1250 MCG
50000 CAPSULE ORAL WEEKLY
Status: ON HOLD | COMMUNITY
End: 2020-07-12

## 2019-12-04 RX ORDER — OXYCODONE HYDROCHLORIDE 10 MG/1
10 TABLET ORAL EVERY 6 HOURS PRN
COMMUNITY

## 2019-12-04 RX ORDER — ASPIRIN 81 MG/1
81 TABLET ORAL DAILY
Status: DISCONTINUED | OUTPATIENT
Start: 2019-12-05 | End: 2019-12-06 | Stop reason: HOSPADM

## 2019-12-04 RX ORDER — DEXTROSE MONOHYDRATE 25 G/50ML
12.5 INJECTION, SOLUTION INTRAVENOUS PRN
Status: DISCONTINUED | OUTPATIENT
Start: 2019-12-04 | End: 2019-12-06 | Stop reason: HOSPADM

## 2019-12-04 RX ORDER — ONDANSETRON 2 MG/ML
4 INJECTION INTRAMUSCULAR; INTRAVENOUS EVERY 6 HOURS PRN
Status: DISCONTINUED | OUTPATIENT
Start: 2019-12-04 | End: 2019-12-06 | Stop reason: HOSPADM

## 2019-12-04 RX ORDER — OXYCODONE AND ACETAMINOPHEN 10; 325 MG/1; MG/1
1 TABLET ORAL EVERY 8 HOURS PRN
Status: DISCONTINUED | OUTPATIENT
Start: 2019-12-04 | End: 2019-12-04

## 2019-12-04 RX ORDER — INSULIN LISPRO 100 [IU]/ML
0-3 INJECTION, SOLUTION INTRAVENOUS; SUBCUTANEOUS NIGHTLY
Status: DISCONTINUED | OUTPATIENT
Start: 2019-12-04 | End: 2019-12-06 | Stop reason: HOSPADM

## 2019-12-04 RX ORDER — VELPATASVIR AND SOFOSBUVIR 100; 400 MG/1; MG/1
1 TABLET, FILM COATED ORAL DAILY
Status: ON HOLD | COMMUNITY
End: 2019-12-28 | Stop reason: CLARIF

## 2019-12-04 RX ORDER — OXYCODONE HYDROCHLORIDE 5 MG/1
10 TABLET ORAL EVERY 6 HOURS PRN
Status: DISCONTINUED | OUTPATIENT
Start: 2019-12-04 | End: 2019-12-06 | Stop reason: HOSPADM

## 2019-12-04 RX ORDER — ACETAMINOPHEN 325 MG/1
650 TABLET ORAL EVERY 4 HOURS PRN
Status: DISCONTINUED | OUTPATIENT
Start: 2019-12-04 | End: 2019-12-06 | Stop reason: HOSPADM

## 2019-12-04 RX ORDER — GABAPENTIN 300 MG/1
300 CAPSULE ORAL 3 TIMES DAILY
Status: DISCONTINUED | OUTPATIENT
Start: 2019-12-04 | End: 2019-12-06 | Stop reason: HOSPADM

## 2019-12-04 RX ORDER — PROPRANOLOL HYDROCHLORIDE 20 MG/1
10 TABLET ORAL 3 TIMES DAILY
Status: DISCONTINUED | OUTPATIENT
Start: 2019-12-04 | End: 2019-12-06 | Stop reason: HOSPADM

## 2019-12-04 RX ORDER — PANTOPRAZOLE SODIUM 40 MG/1
40 TABLET, DELAYED RELEASE ORAL 2 TIMES DAILY
Status: DISCONTINUED | OUTPATIENT
Start: 2019-12-04 | End: 2019-12-06 | Stop reason: HOSPADM

## 2019-12-04 RX ORDER — HYDROXYZINE HYDROCHLORIDE 25 MG/1
25 TABLET, FILM COATED ORAL EVERY 6 HOURS PRN
Status: DISCONTINUED | OUTPATIENT
Start: 2019-12-04 | End: 2019-12-06 | Stop reason: HOSPADM

## 2019-12-04 RX ORDER — METHADONE HYDROCHLORIDE 10 MG/1
10 TABLET ORAL 2 TIMES DAILY
Status: DISCONTINUED | OUTPATIENT
Start: 2019-12-04 | End: 2019-12-06 | Stop reason: HOSPADM

## 2019-12-04 RX ORDER — HEPARIN SODIUM 5000 [USP'U]/ML
5000 INJECTION, SOLUTION INTRAVENOUS; SUBCUTANEOUS EVERY 8 HOURS SCHEDULED
Status: DISCONTINUED | OUTPATIENT
Start: 2019-12-04 | End: 2019-12-06 | Stop reason: HOSPADM

## 2019-12-04 RX ORDER — VELPATASVIR AND SOFOSBUVIR 100; 400 MG/1; MG/1
1 TABLET, FILM COATED ORAL DAILY
Status: DISCONTINUED | OUTPATIENT
Start: 2019-12-05 | End: 2019-12-06 | Stop reason: HOSPADM

## 2019-12-04 RX ORDER — SODIUM CHLORIDE 9 MG/ML
INJECTION, SOLUTION INTRAVENOUS CONTINUOUS
Status: DISCONTINUED | OUTPATIENT
Start: 2019-12-04 | End: 2019-12-04

## 2019-12-04 RX ORDER — NICOTINE POLACRILEX 4 MG
15 LOZENGE BUCCAL PRN
Status: DISCONTINUED | OUTPATIENT
Start: 2019-12-04 | End: 2019-12-06 | Stop reason: HOSPADM

## 2019-12-04 RX ORDER — PANTOPRAZOLE SODIUM 40 MG/1
40 TABLET, DELAYED RELEASE ORAL 2 TIMES DAILY
COMMUNITY

## 2019-12-04 RX ORDER — SPIRONOLACTONE 100 MG/1
100 TABLET, FILM COATED ORAL DAILY
Status: CANCELLED | OUTPATIENT
Start: 2019-12-04

## 2019-12-04 RX ORDER — AMLODIPINE BESYLATE 2.5 MG/1
2.5 TABLET ORAL DAILY
Status: DISCONTINUED | OUTPATIENT
Start: 2019-12-05 | End: 2019-12-05

## 2019-12-04 RX ADMIN — SODIUM CHLORIDE: 9 INJECTION, SOLUTION INTRAVENOUS at 21:22

## 2019-12-04 RX ADMIN — OXYCODONE HYDROCHLORIDE AND ACETAMINOPHEN 1 TABLET: 10; 325 TABLET ORAL at 18:27

## 2019-12-04 RX ADMIN — HEPARIN SODIUM 5000 UNITS: 5000 INJECTION INTRAVENOUS; SUBCUTANEOUS at 22:04

## 2019-12-04 RX ADMIN — HYDROXYZINE HYDROCHLORIDE 25 MG: 25 TABLET, FILM COATED ORAL at 22:03

## 2019-12-04 RX ADMIN — LORAZEPAM 1 MG: 1 TABLET ORAL at 14:22

## 2019-12-04 RX ADMIN — GABAPENTIN 300 MG: 300 CAPSULE ORAL at 22:03

## 2019-12-04 RX ADMIN — INSULIN LISPRO 1 UNITS: 100 INJECTION, SOLUTION INTRAVENOUS; SUBCUTANEOUS at 23:48

## 2019-12-04 RX ADMIN — PROPRANOLOL HYDROCHLORIDE 10 MG: 20 TABLET ORAL at 23:58

## 2019-12-04 RX ADMIN — METHADONE HYDROCHLORIDE 10 MG: 10 TABLET ORAL at 22:03

## 2019-12-04 RX ADMIN — SODIUM CHLORIDE: 0.9 INJECTION, SOLUTION INTRAVENOUS at 18:27

## 2019-12-04 ASSESSMENT — ENCOUNTER SYMPTOMS
VOMITING: 0
NAUSEA: 0
ABDOMINAL DISTENTION: 0
COUGH: 0
WHEEZING: 0
SHORTNESS OF BREATH: 1
DIARRHEA: 0
ABDOMINAL PAIN: 0

## 2019-12-04 ASSESSMENT — PAIN DESCRIPTION - LOCATION: LOCATION: GENERALIZED

## 2019-12-04 ASSESSMENT — PAIN DESCRIPTION - PAIN TYPE: TYPE: CHRONIC PAIN

## 2019-12-04 ASSESSMENT — PAIN SCALES - GENERAL
PAINLEVEL_OUTOF10: 7
PAINLEVEL_OUTOF10: 8

## 2019-12-05 LAB
ALBUMIN SERPL-MCNC: 2.9 G/DL (ref 3.4–5)
ALBUMIN SERPL-MCNC: 2.9 G/DL (ref 3.4–5)
ANION GAP SERPL CALCULATED.3IONS-SCNC: 11 MMOL/L (ref 3–16)
ANION GAP SERPL CALCULATED.3IONS-SCNC: 12 MMOL/L (ref 3–16)
BASOPHILS ABSOLUTE: 0 K/UL (ref 0–0.2)
BASOPHILS RELATIVE PERCENT: 0.6 %
BUN BLDV-MCNC: 41 MG/DL (ref 7–20)
BUN BLDV-MCNC: 44 MG/DL (ref 7–20)
CALCIUM SERPL-MCNC: 8.6 MG/DL (ref 8.3–10.6)
CALCIUM SERPL-MCNC: 8.8 MG/DL (ref 8.3–10.6)
CHLORIDE BLD-SCNC: 93 MMOL/L (ref 99–110)
CHLORIDE BLD-SCNC: 93 MMOL/L (ref 99–110)
CO2: 20 MMOL/L (ref 21–32)
CO2: 21 MMOL/L (ref 21–32)
CREAT SERPL-MCNC: 1.6 MG/DL (ref 0.9–1.3)
CREAT SERPL-MCNC: 1.6 MG/DL (ref 0.9–1.3)
EOSINOPHILS ABSOLUTE: 0.1 K/UL (ref 0–0.6)
EOSINOPHILS RELATIVE PERCENT: 2.5 %
GFR AFRICAN AMERICAN: 54
GFR AFRICAN AMERICAN: 54
GFR NON-AFRICAN AMERICAN: 45
GFR NON-AFRICAN AMERICAN: 45
GLUCOSE BLD-MCNC: 102 MG/DL (ref 70–99)
GLUCOSE BLD-MCNC: 144 MG/DL (ref 70–99)
GLUCOSE BLD-MCNC: 165 MG/DL (ref 70–99)
GLUCOSE BLD-MCNC: 186 MG/DL (ref 70–99)
GLUCOSE BLD-MCNC: 212 MG/DL (ref 70–99)
GLUCOSE BLD-MCNC: 220 MG/DL (ref 70–99)
HCT VFR BLD CALC: 30.5 % (ref 40.5–52.5)
HEMOGLOBIN: 10.3 G/DL (ref 13.5–17.5)
LYMPHOCYTES ABSOLUTE: 1.4 K/UL (ref 1–5.1)
LYMPHOCYTES RELATIVE PERCENT: 51.5 %
MCH RBC QN AUTO: 33.8 PG (ref 26–34)
MCHC RBC AUTO-ENTMCNC: 33.8 G/DL (ref 31–36)
MCV RBC AUTO: 100.2 FL (ref 80–100)
MONOCYTES ABSOLUTE: 0.4 K/UL (ref 0–1.3)
MONOCYTES RELATIVE PERCENT: 13.1 %
NEUTROPHILS ABSOLUTE: 0.9 K/UL (ref 1.7–7.7)
NEUTROPHILS RELATIVE PERCENT: 32.3 %
PDW BLD-RTO: 14.7 % (ref 12.4–15.4)
PERFORMED ON: ABNORMAL
PHOSPHORUS: 4.4 MG/DL (ref 2.5–4.9)
PHOSPHORUS: 4.4 MG/DL (ref 2.5–4.9)
PLATELET # BLD: 73 K/UL (ref 135–450)
PMV BLD AUTO: 8.2 FL (ref 5–10.5)
POTASSIUM SERPL-SCNC: 4.3 MMOL/L (ref 3.5–5.1)
POTASSIUM SERPL-SCNC: 5 MMOL/L (ref 3.5–5.1)
RBC # BLD: 3.04 M/UL (ref 4.2–5.9)
SODIUM BLD-SCNC: 124 MMOL/L (ref 136–145)
SODIUM BLD-SCNC: 126 MMOL/L (ref 136–145)
SODIUM BLD-SCNC: 128 MMOL/L (ref 136–145)
SODIUM BLD-SCNC: 128 MMOL/L (ref 136–145)
SODIUM BLD-SCNC: 131 MMOL/L (ref 136–145)
SODIUM BLD-SCNC: 135 MMOL/L (ref 136–145)
WBC # BLD: 2.7 K/UL (ref 4–11)

## 2019-12-05 PROCEDURE — 6360000002 HC RX W HCPCS: Performed by: INTERNAL MEDICINE

## 2019-12-05 PROCEDURE — 80069 RENAL FUNCTION PANEL: CPT

## 2019-12-05 PROCEDURE — 97530 THERAPEUTIC ACTIVITIES: CPT

## 2019-12-05 PROCEDURE — 84295 ASSAY OF SERUM SODIUM: CPT

## 2019-12-05 PROCEDURE — 97166 OT EVAL MOD COMPLEX 45 MIN: CPT

## 2019-12-05 PROCEDURE — P9047 ALBUMIN (HUMAN), 25%, 50ML: HCPCS | Performed by: INTERNAL MEDICINE

## 2019-12-05 PROCEDURE — 6370000000 HC RX 637 (ALT 250 FOR IP): Performed by: STUDENT IN AN ORGANIZED HEALTH CARE EDUCATION/TRAINING PROGRAM

## 2019-12-05 PROCEDURE — 85025 COMPLETE CBC W/AUTO DIFF WBC: CPT

## 2019-12-05 PROCEDURE — 97162 PT EVAL MOD COMPLEX 30 MIN: CPT

## 2019-12-05 PROCEDURE — 36415 COLL VENOUS BLD VENIPUNCTURE: CPT

## 2019-12-05 PROCEDURE — 6360000002 HC RX W HCPCS: Performed by: STUDENT IN AN ORGANIZED HEALTH CARE EDUCATION/TRAINING PROGRAM

## 2019-12-05 PROCEDURE — 97116 GAIT TRAINING THERAPY: CPT

## 2019-12-05 PROCEDURE — 97535 SELF CARE MNGMENT TRAINING: CPT

## 2019-12-05 PROCEDURE — 2060000000 HC ICU INTERMEDIATE R&B

## 2019-12-05 RX ORDER — ALBUMIN (HUMAN) 12.5 G/50ML
25 SOLUTION INTRAVENOUS EVERY 8 HOURS
Status: COMPLETED | OUTPATIENT
Start: 2019-12-05 | End: 2019-12-06

## 2019-12-05 RX ORDER — ALBUTEROL SULFATE 2.5 MG/3ML
2.5 SOLUTION RESPIRATORY (INHALATION)
Status: DISCONTINUED | OUTPATIENT
Start: 2019-12-05 | End: 2019-12-06 | Stop reason: HOSPADM

## 2019-12-05 RX ADMIN — METHADONE HYDROCHLORIDE 10 MG: 10 TABLET ORAL at 20:02

## 2019-12-05 RX ADMIN — ASPIRIN 81 MG: 81 TABLET, COATED ORAL at 11:18

## 2019-12-05 RX ADMIN — INSULIN LISPRO 1 UNITS: 100 INJECTION, SOLUTION INTRAVENOUS; SUBCUTANEOUS at 13:20

## 2019-12-05 RX ADMIN — GABAPENTIN 300 MG: 300 CAPSULE ORAL at 13:59

## 2019-12-05 RX ADMIN — OXYCODONE HYDROCHLORIDE 10 MG: 5 TABLET ORAL at 13:58

## 2019-12-05 RX ADMIN — GABAPENTIN 300 MG: 300 CAPSULE ORAL at 11:20

## 2019-12-05 RX ADMIN — OXYCODONE HYDROCHLORIDE 10 MG: 5 TABLET ORAL at 22:09

## 2019-12-05 RX ADMIN — OXYCODONE HYDROCHLORIDE 10 MG: 5 TABLET ORAL at 00:45

## 2019-12-05 RX ADMIN — HYDROXYZINE HYDROCHLORIDE 25 MG: 25 TABLET, FILM COATED ORAL at 18:53

## 2019-12-05 RX ADMIN — HEPARIN SODIUM 5000 UNITS: 5000 INJECTION INTRAVENOUS; SUBCUTANEOUS at 13:59

## 2019-12-05 RX ADMIN — ALBUMIN (HUMAN) 25 G: 0.25 INJECTION, SOLUTION INTRAVENOUS at 11:12

## 2019-12-05 RX ADMIN — INSULIN LISPRO 1 UNITS: 100 INJECTION, SOLUTION INTRAVENOUS; SUBCUTANEOUS at 08:22

## 2019-12-05 RX ADMIN — PANTOPRAZOLE SODIUM 40 MG: 40 TABLET, DELAYED RELEASE ORAL at 20:02

## 2019-12-05 RX ADMIN — GABAPENTIN 300 MG: 300 CAPSULE ORAL at 20:02

## 2019-12-05 RX ADMIN — HYDROXYZINE HYDROCHLORIDE 25 MG: 25 TABLET, FILM COATED ORAL at 06:03

## 2019-12-05 RX ADMIN — PANTOPRAZOLE SODIUM 40 MG: 40 TABLET, DELAYED RELEASE ORAL at 11:13

## 2019-12-05 RX ADMIN — HEPARIN SODIUM 5000 UNITS: 5000 INJECTION INTRAVENOUS; SUBCUTANEOUS at 20:05

## 2019-12-05 RX ADMIN — INSULIN LISPRO 1 UNITS: 100 INJECTION, SOLUTION INTRAVENOUS; SUBCUTANEOUS at 18:54

## 2019-12-05 RX ADMIN — HEPARIN SODIUM 5000 UNITS: 5000 INJECTION INTRAVENOUS; SUBCUTANEOUS at 07:16

## 2019-12-05 RX ADMIN — OXYCODONE HYDROCHLORIDE 10 MG: 5 TABLET ORAL at 07:16

## 2019-12-05 RX ADMIN — METHADONE HYDROCHLORIDE 10 MG: 10 TABLET ORAL at 11:17

## 2019-12-05 RX ADMIN — PROPRANOLOL HYDROCHLORIDE 10 MG: 20 TABLET ORAL at 11:15

## 2019-12-05 RX ADMIN — ALBUMIN (HUMAN) 25 G: 0.25 INJECTION, SOLUTION INTRAVENOUS at 18:41

## 2019-12-05 RX ADMIN — PROPRANOLOL HYDROCHLORIDE 10 MG: 20 TABLET ORAL at 13:58

## 2019-12-05 ASSESSMENT — PAIN SCALES - GENERAL
PAINLEVEL_OUTOF10: 5
PAINLEVEL_OUTOF10: 7
PAINLEVEL_OUTOF10: 6
PAINLEVEL_OUTOF10: 3
PAINLEVEL_OUTOF10: 7
PAINLEVEL_OUTOF10: 4
PAINLEVEL_OUTOF10: 6
PAINLEVEL_OUTOF10: 7
PAINLEVEL_OUTOF10: 9
PAINLEVEL_OUTOF10: 4

## 2019-12-05 ASSESSMENT — PAIN - FUNCTIONAL ASSESSMENT
PAIN_FUNCTIONAL_ASSESSMENT: ACTIVITIES ARE NOT PREVENTED
PAIN_FUNCTIONAL_ASSESSMENT: ACTIVITIES ARE NOT PREVENTED

## 2019-12-05 ASSESSMENT — ENCOUNTER SYMPTOMS
COUGH: 0
VOMITING: 0
SHORTNESS OF BREATH: 0
NAUSEA: 0
ABDOMINAL PAIN: 0
WHEEZING: 0
DIARRHEA: 0
ABDOMINAL DISTENTION: 0

## 2019-12-05 ASSESSMENT — PAIN DESCRIPTION - PAIN TYPE
TYPE: CHRONIC PAIN

## 2019-12-05 ASSESSMENT — PAIN DESCRIPTION - LOCATION
LOCATION: BACK;LEG
LOCATION: LEG
LOCATION: LEG;BACK
LOCATION: BACK;LEG
LOCATION: LEG;BACK

## 2019-12-05 ASSESSMENT — PAIN DESCRIPTION - ONSET: ONSET: GRADUAL

## 2019-12-05 ASSESSMENT — PAIN DESCRIPTION - DESCRIPTORS: DESCRIPTORS: ACHING

## 2019-12-05 ASSESSMENT — PAIN DESCRIPTION - ORIENTATION
ORIENTATION: RIGHT;LEFT
ORIENTATION: RIGHT;LEFT;LOWER

## 2019-12-05 ASSESSMENT — PAIN DESCRIPTION - PROGRESSION
CLINICAL_PROGRESSION: NOT CHANGED
CLINICAL_PROGRESSION: NOT CHANGED

## 2019-12-05 ASSESSMENT — PAIN DESCRIPTION - FREQUENCY: FREQUENCY: CONTINUOUS

## 2019-12-06 VITALS
WEIGHT: 134.7 LBS | HEART RATE: 70 BPM | BODY MASS INDEX: 18.86 KG/M2 | RESPIRATION RATE: 18 BRPM | DIASTOLIC BLOOD PRESSURE: 64 MMHG | SYSTOLIC BLOOD PRESSURE: 125 MMHG | TEMPERATURE: 98.2 F | OXYGEN SATURATION: 99 % | HEIGHT: 71 IN

## 2019-12-06 LAB
ALBUMIN SERPL-MCNC: 3.3 G/DL (ref 3.4–5)
ANION GAP SERPL CALCULATED.3IONS-SCNC: 8 MMOL/L (ref 3–16)
ANISOCYTOSIS: ABNORMAL
BASOPHILS ABSOLUTE: 0 K/UL (ref 0–0.2)
BASOPHILS RELATIVE PERCENT: 1 %
BUN BLDV-MCNC: 27 MG/DL (ref 7–20)
CALCIUM SERPL-MCNC: 9.2 MG/DL (ref 8.3–10.6)
CHLORIDE BLD-SCNC: 105 MMOL/L (ref 99–110)
CO2: 23 MMOL/L (ref 21–32)
CREAT SERPL-MCNC: 1.3 MG/DL (ref 0.9–1.3)
EOSINOPHILS ABSOLUTE: 0 K/UL (ref 0–0.6)
EOSINOPHILS RELATIVE PERCENT: 2 %
GFR AFRICAN AMERICAN: >60
GFR NON-AFRICAN AMERICAN: 57
GLUCOSE BLD-MCNC: 163 MG/DL (ref 70–99)
GLUCOSE BLD-MCNC: 83 MG/DL (ref 70–99)
GLUCOSE BLD-MCNC: 86 MG/DL (ref 70–99)
HCT VFR BLD CALC: 27.7 % (ref 40.5–52.5)
HEMOGLOBIN: 9.3 G/DL (ref 13.5–17.5)
LYMPHOCYTES ABSOLUTE: 1 K/UL (ref 1–5.1)
LYMPHOCYTES RELATIVE PERCENT: 70 %
MACROCYTES: ABNORMAL
MCH RBC QN AUTO: 33.8 PG (ref 26–34)
MCHC RBC AUTO-ENTMCNC: 33.6 G/DL (ref 31–36)
MCV RBC AUTO: 100.5 FL (ref 80–100)
MONOCYTES ABSOLUTE: 0.2 K/UL (ref 0–1.3)
MONOCYTES RELATIVE PERCENT: 14 %
NEUTROPHILS ABSOLUTE: 0.2 K/UL (ref 1.7–7.7)
NEUTROPHILS RELATIVE PERCENT: 13 %
OVALOCYTES: ABNORMAL
PDW BLD-RTO: 15 % (ref 12.4–15.4)
PERFORMED ON: ABNORMAL
PERFORMED ON: NORMAL
PHOSPHORUS: 3.3 MG/DL (ref 2.5–4.9)
PLATELET # BLD: 52 K/UL (ref 135–450)
PMV BLD AUTO: 8.1 FL (ref 5–10.5)
POTASSIUM SERPL-SCNC: 4.4 MMOL/L (ref 3.5–5.1)
RBC # BLD: 2.76 M/UL (ref 4.2–5.9)
SCHISTOCYTES: ABNORMAL
SODIUM BLD-SCNC: 133 MMOL/L (ref 136–145)
SODIUM BLD-SCNC: 135 MMOL/L (ref 136–145)
SODIUM BLD-SCNC: 136 MMOL/L (ref 136–145)
WBC # BLD: 1.4 K/UL (ref 4–11)

## 2019-12-06 PROCEDURE — 85025 COMPLETE CBC W/AUTO DIFF WBC: CPT

## 2019-12-06 PROCEDURE — 2580000003 HC RX 258: Performed by: INTERNAL MEDICINE

## 2019-12-06 PROCEDURE — 6370000000 HC RX 637 (ALT 250 FOR IP): Performed by: STUDENT IN AN ORGANIZED HEALTH CARE EDUCATION/TRAINING PROGRAM

## 2019-12-06 PROCEDURE — 6360000002 HC RX W HCPCS: Performed by: STUDENT IN AN ORGANIZED HEALTH CARE EDUCATION/TRAINING PROGRAM

## 2019-12-06 PROCEDURE — 36415 COLL VENOUS BLD VENIPUNCTURE: CPT

## 2019-12-06 PROCEDURE — P9047 ALBUMIN (HUMAN), 25%, 50ML: HCPCS | Performed by: INTERNAL MEDICINE

## 2019-12-06 PROCEDURE — 80069 RENAL FUNCTION PANEL: CPT

## 2019-12-06 PROCEDURE — 6360000002 HC RX W HCPCS: Performed by: INTERNAL MEDICINE

## 2019-12-06 PROCEDURE — 2580000003 HC RX 258: Performed by: STUDENT IN AN ORGANIZED HEALTH CARE EDUCATION/TRAINING PROGRAM

## 2019-12-06 PROCEDURE — 84295 ASSAY OF SERUM SODIUM: CPT

## 2019-12-06 RX ORDER — DEXTROSE MONOHYDRATE 50 MG/ML
INJECTION, SOLUTION INTRAVENOUS CONTINUOUS
Status: DISCONTINUED | OUTPATIENT
Start: 2019-12-06 | End: 2019-12-06 | Stop reason: HOSPADM

## 2019-12-06 RX ADMIN — OXYCODONE HYDROCHLORIDE 10 MG: 5 TABLET ORAL at 12:27

## 2019-12-06 RX ADMIN — Medication 10 ML: at 09:00

## 2019-12-06 RX ADMIN — OXYCODONE HYDROCHLORIDE 10 MG: 5 TABLET ORAL at 05:26

## 2019-12-06 RX ADMIN — HEPARIN SODIUM 5000 UNITS: 5000 INJECTION INTRAVENOUS; SUBCUTANEOUS at 05:26

## 2019-12-06 RX ADMIN — PANTOPRAZOLE SODIUM 40 MG: 40 TABLET, DELAYED RELEASE ORAL at 09:51

## 2019-12-06 RX ADMIN — ALBUMIN (HUMAN) 25 G: 0.25 INJECTION, SOLUTION INTRAVENOUS at 01:20

## 2019-12-06 RX ADMIN — PROPRANOLOL HYDROCHLORIDE 10 MG: 20 TABLET ORAL at 09:50

## 2019-12-06 RX ADMIN — INSULIN LISPRO 1 UNITS: 100 INJECTION, SOLUTION INTRAVENOUS; SUBCUTANEOUS at 12:39

## 2019-12-06 RX ADMIN — GABAPENTIN 300 MG: 300 CAPSULE ORAL at 09:49

## 2019-12-06 RX ADMIN — ASPIRIN 81 MG: 81 TABLET, COATED ORAL at 09:52

## 2019-12-06 RX ADMIN — DEXTROSE MONOHYDRATE 100 ML: 50 INJECTION, SOLUTION INTRAVENOUS at 11:10

## 2019-12-06 RX ADMIN — METHADONE HYDROCHLORIDE 10 MG: 10 TABLET ORAL at 09:50

## 2019-12-06 ASSESSMENT — PAIN DESCRIPTION - ONSET
ONSET: GRADUAL

## 2019-12-06 ASSESSMENT — PAIN DESCRIPTION - PROGRESSION
CLINICAL_PROGRESSION: NOT CHANGED

## 2019-12-06 ASSESSMENT — PAIN SCALES - GENERAL
PAINLEVEL_OUTOF10: 6
PAINLEVEL_OUTOF10: 4
PAINLEVEL_OUTOF10: 5
PAINLEVEL_OUTOF10: 6
PAINLEVEL_OUTOF10: 6
PAINLEVEL_OUTOF10: 5

## 2019-12-06 ASSESSMENT — PAIN DESCRIPTION - FREQUENCY
FREQUENCY: CONTINUOUS

## 2019-12-06 ASSESSMENT — PAIN DESCRIPTION - LOCATION
LOCATION: BACK;LEG

## 2019-12-06 ASSESSMENT — PAIN DESCRIPTION - PAIN TYPE
TYPE: CHRONIC PAIN

## 2019-12-06 ASSESSMENT — PAIN DESCRIPTION - DESCRIPTORS
DESCRIPTORS: ACHING

## 2019-12-06 ASSESSMENT — PAIN DESCRIPTION - ORIENTATION
ORIENTATION: RIGHT;LEFT;LOWER

## 2019-12-06 ASSESSMENT — PAIN - FUNCTIONAL ASSESSMENT
PAIN_FUNCTIONAL_ASSESSMENT: ACTIVITIES ARE NOT PREVENTED

## 2019-12-27 ENCOUNTER — HOSPITAL ENCOUNTER (INPATIENT)
Age: 59
LOS: 1 days | Discharge: HOME | DRG: 279 | End: 2019-12-29
Attending: EMERGENCY MEDICINE | Admitting: INTERNAL MEDICINE
Payer: COMMERCIAL

## 2019-12-27 ENCOUNTER — APPOINTMENT (OUTPATIENT)
Dept: GENERAL RADIOLOGY | Age: 59
DRG: 279 | End: 2019-12-27
Payer: COMMERCIAL

## 2019-12-27 DIAGNOSIS — K76.82 HEPATIC ENCEPHALOPATHY: Primary | ICD-10-CM

## 2019-12-27 PROCEDURE — 83690 ASSAY OF LIPASE: CPT

## 2019-12-27 PROCEDURE — 82140 ASSAY OF AMMONIA: CPT

## 2019-12-27 PROCEDURE — 99285 EMERGENCY DEPT VISIT HI MDM: CPT

## 2019-12-27 PROCEDURE — 36415 COLL VENOUS BLD VENIPUNCTURE: CPT

## 2019-12-27 PROCEDURE — 80053 COMPREHEN METABOLIC PANEL: CPT

## 2019-12-27 PROCEDURE — 85025 COMPLETE CBC W/AUTO DIFF WBC: CPT

## 2019-12-27 PROCEDURE — 83036 HEMOGLOBIN GLYCOSYLATED A1C: CPT

## 2019-12-27 PROCEDURE — 93005 ELECTROCARDIOGRAM TRACING: CPT | Performed by: PODIATRIST

## 2019-12-27 PROCEDURE — 81003 URINALYSIS AUTO W/O SCOPE: CPT

## 2019-12-27 PROCEDURE — 71046 X-RAY EXAM CHEST 2 VIEWS: CPT

## 2019-12-27 ASSESSMENT — ENCOUNTER SYMPTOMS
SORE THROAT: 0
DIARRHEA: 0
SHORTNESS OF BREATH: 0
VOMITING: 0
COUGH: 0
TROUBLE SWALLOWING: 0
NAUSEA: 0
CONSTIPATION: 0
CHEST TIGHTNESS: 0
ABDOMINAL PAIN: 0

## 2019-12-28 PROBLEM — K76.82 HEPATIC ENCEPHALOPATHY: Status: ACTIVE | Noted: 2019-12-28

## 2019-12-28 LAB
A/G RATIO: 0.9 (ref 1.1–2.2)
ALBUMIN SERPL-MCNC: 3.4 G/DL (ref 3.4–5)
ALP BLD-CCNC: 206 U/L (ref 40–129)
ALT SERPL-CCNC: 29 U/L (ref 10–40)
AMMONIA: 171 UMOL/L (ref 16–60)
AMMONIA: 80 UMOL/L (ref 16–60)
AMMONIA: 93 UMOL/L (ref 16–60)
ANION GAP SERPL CALCULATED.3IONS-SCNC: 12 MMOL/L (ref 3–16)
ANION GAP SERPL CALCULATED.3IONS-SCNC: 13 MMOL/L (ref 3–16)
AST SERPL-CCNC: 55 U/L (ref 15–37)
BASOPHILS ABSOLUTE: 0 K/UL (ref 0–0.2)
BASOPHILS RELATIVE PERCENT: 1.3 %
BILIRUB SERPL-MCNC: 1.1 MG/DL (ref 0–1)
BILIRUBIN URINE: NEGATIVE
BLOOD, URINE: NEGATIVE
BUN BLDV-MCNC: 18 MG/DL (ref 7–20)
BUN BLDV-MCNC: 18 MG/DL (ref 7–20)
CALCIUM SERPL-MCNC: 8.9 MG/DL (ref 8.3–10.6)
CALCIUM SERPL-MCNC: 9 MG/DL (ref 8.3–10.6)
CHLORIDE BLD-SCNC: 93 MMOL/L (ref 99–110)
CHLORIDE BLD-SCNC: 98 MMOL/L (ref 99–110)
CLARITY: CLEAR
CO2: 26 MMOL/L (ref 21–32)
CO2: 27 MMOL/L (ref 21–32)
COLOR: YELLOW
CREAT SERPL-MCNC: 1.3 MG/DL (ref 0.9–1.3)
CREAT SERPL-MCNC: 1.5 MG/DL (ref 0.9–1.3)
EKG ATRIAL RATE: 77 BPM
EKG DIAGNOSIS: NORMAL
EKG P AXIS: 58 DEGREES
EKG P-R INTERVAL: 142 MS
EKG Q-T INTERVAL: 394 MS
EKG QRS DURATION: 86 MS
EKG QTC CALCULATION (BAZETT): 445 MS
EKG R AXIS: 60 DEGREES
EKG T AXIS: 55 DEGREES
EKG VENTRICULAR RATE: 77 BPM
EOSINOPHILS ABSOLUTE: 0.1 K/UL (ref 0–0.6)
EOSINOPHILS RELATIVE PERCENT: 2.4 %
ESTIMATED AVERAGE GLUCOSE: 105.4 MG/DL
GFR AFRICAN AMERICAN: 58
GFR AFRICAN AMERICAN: >60
GFR NON-AFRICAN AMERICAN: 48
GFR NON-AFRICAN AMERICAN: 57
GLOBULIN: 4 G/DL
GLUCOSE BLD-MCNC: 111 MG/DL (ref 70–99)
GLUCOSE BLD-MCNC: 146 MG/DL (ref 70–99)
GLUCOSE URINE: NEGATIVE MG/DL
HBA1C MFR BLD: 5.3 %
HCT VFR BLD CALC: 33.3 % (ref 40.5–52.5)
HEMOGLOBIN: 11.2 G/DL (ref 13.5–17.5)
INR BLD: 1.41 (ref 0.86–1.14)
KETONES, URINE: NEGATIVE MG/DL
LEUKOCYTE ESTERASE, URINE: NEGATIVE
LIPASE: 60 U/L (ref 13–60)
LYMPHOCYTES ABSOLUTE: 0.8 K/UL (ref 1–5.1)
LYMPHOCYTES RELATIVE PERCENT: 36.4 %
MCH RBC QN AUTO: 34.1 PG (ref 26–34)
MCHC RBC AUTO-ENTMCNC: 33.7 G/DL (ref 31–36)
MCV RBC AUTO: 101.3 FL (ref 80–100)
MICROSCOPIC EXAMINATION: NORMAL
MONOCYTES ABSOLUTE: 0.3 K/UL (ref 0–1.3)
MONOCYTES RELATIVE PERCENT: 14 %
NEUTROPHILS ABSOLUTE: 1 K/UL (ref 1.7–7.7)
NEUTROPHILS RELATIVE PERCENT: 45.9 %
NITRITE, URINE: NEGATIVE
PDW BLD-RTO: 15.1 % (ref 12.4–15.4)
PH UA: 6.5 (ref 5–8)
PLATELET # BLD: 87 K/UL (ref 135–450)
PMV BLD AUTO: 8.5 FL (ref 5–10.5)
POTASSIUM REFLEX MAGNESIUM: 3.9 MMOL/L (ref 3.5–5.1)
POTASSIUM REFLEX MAGNESIUM: 4.1 MMOL/L (ref 3.5–5.1)
PROTEIN UA: NEGATIVE MG/DL
PROTHROMBIN TIME: 16.4 SEC (ref 10–13.2)
RBC # BLD: 3.29 M/UL (ref 4.2–5.9)
SODIUM BLD-SCNC: 132 MMOL/L (ref 136–145)
SODIUM BLD-SCNC: 137 MMOL/L (ref 136–145)
SPECIFIC GRAVITY UA: 1.01 (ref 1–1.03)
TOTAL PROTEIN: 7.4 G/DL (ref 6.4–8.2)
URINE REFLEX TO CULTURE: NORMAL
URINE TYPE: NORMAL
UROBILINOGEN, URINE: 0.2 E.U./DL
WBC # BLD: 2.2 K/UL (ref 4–11)

## 2019-12-28 PROCEDURE — 6370000000 HC RX 637 (ALT 250 FOR IP): Performed by: INTERNAL MEDICINE

## 2019-12-28 PROCEDURE — 80048 BASIC METABOLIC PNL TOTAL CA: CPT

## 2019-12-28 PROCEDURE — 1200000000 HC SEMI PRIVATE

## 2019-12-28 PROCEDURE — 82140 ASSAY OF AMMONIA: CPT

## 2019-12-28 PROCEDURE — 6360000002 HC RX W HCPCS: Performed by: STUDENT IN AN ORGANIZED HEALTH CARE EDUCATION/TRAINING PROGRAM

## 2019-12-28 PROCEDURE — 85610 PROTHROMBIN TIME: CPT

## 2019-12-28 PROCEDURE — 6370000000 HC RX 637 (ALT 250 FOR IP): Performed by: STUDENT IN AN ORGANIZED HEALTH CARE EDUCATION/TRAINING PROGRAM

## 2019-12-28 PROCEDURE — 36415 COLL VENOUS BLD VENIPUNCTURE: CPT

## 2019-12-28 PROCEDURE — 2580000003 HC RX 258: Performed by: INTERNAL MEDICINE

## 2019-12-28 PROCEDURE — 87040 BLOOD CULTURE FOR BACTERIA: CPT

## 2019-12-28 RX ORDER — AMLODIPINE BESYLATE 2.5 MG/1
2.5 TABLET ORAL DAILY
Status: DISCONTINUED | OUTPATIENT
Start: 2019-12-28 | End: 2019-12-28

## 2019-12-28 RX ORDER — SODIUM CHLORIDE 0.9 % (FLUSH) 0.9 %
10 SYRINGE (ML) INJECTION EVERY 12 HOURS SCHEDULED
Status: DISCONTINUED | OUTPATIENT
Start: 2019-12-28 | End: 2019-12-29 | Stop reason: HOSPADM

## 2019-12-28 RX ORDER — ONDANSETRON 2 MG/ML
4 INJECTION INTRAMUSCULAR; INTRAVENOUS EVERY 6 HOURS PRN
Status: DISCONTINUED | OUTPATIENT
Start: 2019-12-28 | End: 2019-12-29 | Stop reason: HOSPADM

## 2019-12-28 RX ORDER — HYDROXYZINE HYDROCHLORIDE 25 MG/1
25 TABLET, FILM COATED ORAL EVERY 6 HOURS PRN
Status: DISCONTINUED | OUTPATIENT
Start: 2019-12-28 | End: 2019-12-29 | Stop reason: HOSPADM

## 2019-12-28 RX ORDER — 0.9 % SODIUM CHLORIDE 0.9 %
500 INTRAVENOUS SOLUTION INTRAVENOUS ONCE
Status: COMPLETED | OUTPATIENT
Start: 2019-12-28 | End: 2019-12-28

## 2019-12-28 RX ORDER — LACTULOSE 10 G/15ML
20 SOLUTION ORAL 3 TIMES DAILY
Status: DISCONTINUED | OUTPATIENT
Start: 2019-12-28 | End: 2019-12-28

## 2019-12-28 RX ORDER — OXYCODONE HYDROCHLORIDE 5 MG/1
10 TABLET ORAL EVERY 8 HOURS PRN
Status: DISCONTINUED | OUTPATIENT
Start: 2019-12-28 | End: 2019-12-29 | Stop reason: HOSPADM

## 2019-12-28 RX ORDER — SODIUM CHLORIDE, SODIUM LACTATE, POTASSIUM CHLORIDE, CALCIUM CHLORIDE 600; 310; 30; 20 MG/100ML; MG/100ML; MG/100ML; MG/100ML
INJECTION, SOLUTION INTRAVENOUS ONCE
Status: COMPLETED | OUTPATIENT
Start: 2019-12-28 | End: 2019-12-28

## 2019-12-28 RX ORDER — SPIRONOLACTONE 50 MG/1
50 TABLET, FILM COATED ORAL DAILY
COMMUNITY

## 2019-12-28 RX ORDER — SODIUM CHLORIDE 0.9 % (FLUSH) 0.9 %
10 SYRINGE (ML) INJECTION PRN
Status: DISCONTINUED | OUTPATIENT
Start: 2019-12-28 | End: 2019-12-29 | Stop reason: HOSPADM

## 2019-12-28 RX ORDER — LACTULOSE 10 G/15ML
20 SOLUTION ORAL 3 TIMES DAILY
Status: DISCONTINUED | OUTPATIENT
Start: 2019-12-28 | End: 2019-12-29 | Stop reason: HOSPADM

## 2019-12-28 RX ORDER — SENNA AND DOCUSATE SODIUM 50; 8.6 MG/1; MG/1
2 TABLET, FILM COATED ORAL NIGHTLY
Status: ON HOLD | COMMUNITY
End: 2020-07-12

## 2019-12-28 RX ORDER — PROPRANOLOL HYDROCHLORIDE 20 MG/1
10 TABLET ORAL NIGHTLY
Status: DISCONTINUED | OUTPATIENT
Start: 2019-12-29 | End: 2019-12-29 | Stop reason: HOSPADM

## 2019-12-28 RX ORDER — FUROSEMIDE 20 MG/1
20 TABLET ORAL 2 TIMES DAILY
COMMUNITY

## 2019-12-28 RX ORDER — HEPARIN SODIUM 5000 [USP'U]/ML
5000 INJECTION, SOLUTION INTRAVENOUS; SUBCUTANEOUS 2 TIMES DAILY
Status: DISCONTINUED | OUTPATIENT
Start: 2019-12-28 | End: 2019-12-29 | Stop reason: HOSPADM

## 2019-12-28 RX ORDER — LACTULOSE 10 G/15ML
30 SOLUTION ORAL
Status: DISCONTINUED | OUTPATIENT
Start: 2019-12-28 | End: 2019-12-28

## 2019-12-28 RX ORDER — METHADONE HYDROCHLORIDE 10 MG/1
10 TABLET ORAL 2 TIMES DAILY
Status: DISCONTINUED | OUTPATIENT
Start: 2019-12-28 | End: 2019-12-29 | Stop reason: HOSPADM

## 2019-12-28 RX ORDER — PROPRANOLOL HYDROCHLORIDE 20 MG/1
10 TABLET ORAL 3 TIMES DAILY
Status: DISCONTINUED | OUTPATIENT
Start: 2019-12-28 | End: 2019-12-28

## 2019-12-28 RX ORDER — VELPATASVIR AND SOFOSBUVIR 100; 400 MG/1; MG/1
1 TABLET, FILM COATED ORAL DAILY
Status: DISCONTINUED | OUTPATIENT
Start: 2019-12-28 | End: 2019-12-28

## 2019-12-28 RX ADMIN — OXYCODONE 10 MG: 5 TABLET ORAL at 13:54

## 2019-12-28 RX ADMIN — OXYCODONE 10 MG: 5 TABLET ORAL at 04:20

## 2019-12-28 RX ADMIN — OXYCODONE 10 MG: 5 TABLET ORAL at 22:12

## 2019-12-28 RX ADMIN — Medication 10 ML: at 08:10

## 2019-12-28 RX ADMIN — METHADONE HYDROCHLORIDE 10 MG: 10 TABLET ORAL at 21:25

## 2019-12-28 RX ADMIN — RIFAXIMIN 550 MG: 550 TABLET ORAL at 21:25

## 2019-12-28 RX ADMIN — PROPRANOLOL HYDROCHLORIDE 10 MG: 20 TABLET ORAL at 13:54

## 2019-12-28 RX ADMIN — PROPRANOLOL HYDROCHLORIDE 10 MG: 20 TABLET ORAL at 08:10

## 2019-12-28 RX ADMIN — HYDROXYZINE HYDROCHLORIDE 25 MG: 25 TABLET, FILM COATED ORAL at 18:11

## 2019-12-28 RX ADMIN — SODIUM CHLORIDE 500 ML: 9 INJECTION, SOLUTION INTRAVENOUS at 02:53

## 2019-12-28 RX ADMIN — LACTULOSE 30 G: 20 SOLUTION ORAL at 09:59

## 2019-12-28 RX ADMIN — RIFAXIMIN 550 MG: 550 TABLET ORAL at 08:10

## 2019-12-28 RX ADMIN — Medication 10 ML: at 21:29

## 2019-12-28 RX ADMIN — HEPARIN SODIUM 5000 UNITS: 5000 INJECTION INTRAVENOUS; SUBCUTANEOUS at 11:16

## 2019-12-28 RX ADMIN — HEPARIN SODIUM 5000 UNITS: 5000 INJECTION INTRAVENOUS; SUBCUTANEOUS at 21:25

## 2019-12-28 RX ADMIN — LACTULOSE 20 G: 20 SOLUTION ORAL at 02:52

## 2019-12-28 RX ADMIN — RIFAXIMIN 550 MG: 550 TABLET ORAL at 02:52

## 2019-12-28 RX ADMIN — METHADONE HYDROCHLORIDE 10 MG: 10 TABLET ORAL at 11:16

## 2019-12-28 RX ADMIN — SODIUM CHLORIDE, POTASSIUM CHLORIDE, SODIUM LACTATE AND CALCIUM CHLORIDE: 600; 310; 30; 20 INJECTION, SOLUTION INTRAVENOUS at 08:10

## 2019-12-28 RX ADMIN — LACTULOSE 30 G: 20 SOLUTION ORAL at 08:10

## 2019-12-28 ASSESSMENT — PAIN SCALES - GENERAL
PAINLEVEL_OUTOF10: 0
PAINLEVEL_OUTOF10: 7
PAINLEVEL_OUTOF10: 6
PAINLEVEL_OUTOF10: 0
PAINLEVEL_OUTOF10: 7
PAINLEVEL_OUTOF10: 6
PAINLEVEL_OUTOF10: 0
PAINLEVEL_OUTOF10: 3
PAINLEVEL_OUTOF10: 6

## 2019-12-28 ASSESSMENT — PAIN DESCRIPTION - LOCATION
LOCATION: BACK;LEG
LOCATION: BACK;LEG
LOCATION: LEG;BACK
LOCATION: BACK;LEG

## 2019-12-28 ASSESSMENT — PAIN DESCRIPTION - DESCRIPTORS
DESCRIPTORS: ACHING

## 2019-12-28 ASSESSMENT — ENCOUNTER SYMPTOMS: COLOR CHANGE: 0

## 2019-12-28 ASSESSMENT — PAIN DESCRIPTION - ONSET
ONSET: ON-GOING

## 2019-12-28 ASSESSMENT — PAIN DESCRIPTION - PAIN TYPE
TYPE: CHRONIC PAIN

## 2019-12-28 ASSESSMENT — PAIN - FUNCTIONAL ASSESSMENT
PAIN_FUNCTIONAL_ASSESSMENT: ACTIVITIES ARE NOT PREVENTED

## 2019-12-28 ASSESSMENT — PAIN DESCRIPTION - PROGRESSION
CLINICAL_PROGRESSION: NOT CHANGED

## 2019-12-28 ASSESSMENT — PAIN DESCRIPTION - FREQUENCY
FREQUENCY: CONTINUOUS

## 2019-12-28 ASSESSMENT — PAIN DESCRIPTION - ORIENTATION
ORIENTATION: RIGHT;LEFT

## 2019-12-29 VITALS
OXYGEN SATURATION: 100 % | HEART RATE: 64 BPM | RESPIRATION RATE: 16 BRPM | BODY MASS INDEX: 20.77 KG/M2 | SYSTOLIC BLOOD PRESSURE: 126 MMHG | HEIGHT: 71 IN | WEIGHT: 148.37 LBS | DIASTOLIC BLOOD PRESSURE: 72 MMHG | TEMPERATURE: 97.8 F

## 2019-12-29 LAB
ALBUMIN SERPL-MCNC: 2.8 G/DL (ref 3.4–5)
ALP BLD-CCNC: 161 U/L (ref 40–129)
ALT SERPL-CCNC: 24 U/L (ref 10–40)
ANION GAP SERPL CALCULATED.3IONS-SCNC: 10 MMOL/L (ref 3–16)
AST SERPL-CCNC: 44 U/L (ref 15–37)
BASOPHILS ABSOLUTE: 0 K/UL (ref 0–0.2)
BASOPHILS RELATIVE PERCENT: 0.6 %
BILIRUB SERPL-MCNC: 0.9 MG/DL (ref 0–1)
BILIRUBIN DIRECT: 0.3 MG/DL (ref 0–0.3)
BILIRUBIN, INDIRECT: 0.6 MG/DL (ref 0–1)
BUN BLDV-MCNC: 14 MG/DL (ref 7–20)
CALCIUM SERPL-MCNC: 8.4 MG/DL (ref 8.3–10.6)
CHLORIDE BLD-SCNC: 100 MMOL/L (ref 99–110)
CO2: 25 MMOL/L (ref 21–32)
CREAT SERPL-MCNC: 1.1 MG/DL (ref 0.9–1.3)
EOSINOPHILS ABSOLUTE: 0.1 K/UL (ref 0–0.6)
EOSINOPHILS RELATIVE PERCENT: 2.9 %
GFR AFRICAN AMERICAN: >60
GFR NON-AFRICAN AMERICAN: >60
GLUCOSE BLD-MCNC: 144 MG/DL (ref 70–99)
HCT VFR BLD CALC: 30 % (ref 40.5–52.5)
HEMOGLOBIN: 10.1 G/DL (ref 13.5–17.5)
LACTIC ACID: 1 MMOL/L (ref 0.4–2)
LYMPHOCYTES ABSOLUTE: 1.1 K/UL (ref 1–5.1)
LYMPHOCYTES RELATIVE PERCENT: 53.1 %
MCH RBC QN AUTO: 34.7 PG (ref 26–34)
MCHC RBC AUTO-ENTMCNC: 33.7 G/DL (ref 31–36)
MCV RBC AUTO: 102.9 FL (ref 80–100)
MONOCYTES ABSOLUTE: 0.3 K/UL (ref 0–1.3)
MONOCYTES RELATIVE PERCENT: 14.7 %
NEUTROPHILS ABSOLUTE: 0.6 K/UL (ref 1.7–7.7)
NEUTROPHILS RELATIVE PERCENT: 28.7 %
PDW BLD-RTO: 15.3 % (ref 12.4–15.4)
PLATELET # BLD: 67 K/UL (ref 135–450)
PMV BLD AUTO: 8.1 FL (ref 5–10.5)
POTASSIUM REFLEX MAGNESIUM: 4.2 MMOL/L (ref 3.5–5.1)
RBC # BLD: 2.92 M/UL (ref 4.2–5.9)
SODIUM BLD-SCNC: 135 MMOL/L (ref 136–145)
TOTAL PROTEIN: 6.5 G/DL (ref 6.4–8.2)
WBC # BLD: 2.1 K/UL (ref 4–11)

## 2019-12-29 PROCEDURE — 80076 HEPATIC FUNCTION PANEL: CPT

## 2019-12-29 PROCEDURE — 6370000000 HC RX 637 (ALT 250 FOR IP): Performed by: INTERNAL MEDICINE

## 2019-12-29 PROCEDURE — 36415 COLL VENOUS BLD VENIPUNCTURE: CPT

## 2019-12-29 PROCEDURE — 80048 BASIC METABOLIC PNL TOTAL CA: CPT

## 2019-12-29 PROCEDURE — 6360000002 HC RX W HCPCS: Performed by: STUDENT IN AN ORGANIZED HEALTH CARE EDUCATION/TRAINING PROGRAM

## 2019-12-29 PROCEDURE — 83605 ASSAY OF LACTIC ACID: CPT

## 2019-12-29 PROCEDURE — 6370000000 HC RX 637 (ALT 250 FOR IP): Performed by: STUDENT IN AN ORGANIZED HEALTH CARE EDUCATION/TRAINING PROGRAM

## 2019-12-29 PROCEDURE — 85025 COMPLETE CBC W/AUTO DIFF WBC: CPT

## 2019-12-29 PROCEDURE — 6370000000 HC RX 637 (ALT 250 FOR IP)

## 2019-12-29 PROCEDURE — 2580000003 HC RX 258: Performed by: INTERNAL MEDICINE

## 2019-12-29 RX ORDER — UREA 10 %
3 LOTION (ML) TOPICAL NIGHTLY
Status: DISCONTINUED | OUTPATIENT
Start: 2019-12-29 | End: 2019-12-29 | Stop reason: HOSPADM

## 2019-12-29 RX ORDER — FUROSEMIDE 20 MG/1
20 TABLET ORAL 2 TIMES DAILY
Status: DISCONTINUED | OUTPATIENT
Start: 2019-12-29 | End: 2019-12-29 | Stop reason: HOSPADM

## 2019-12-29 RX ORDER — LACTULOSE 10 G/15ML
20 SOLUTION ORAL 3 TIMES DAILY
Qty: 1 BOTTLE | Refills: 3 | Status: SHIPPED | OUTPATIENT
Start: 2019-12-29 | End: 2019-12-29

## 2019-12-29 RX ORDER — SPIRONOLACTONE 50 MG/1
50 TABLET, FILM COATED ORAL DAILY
Status: DISCONTINUED | OUTPATIENT
Start: 2019-12-29 | End: 2019-12-29 | Stop reason: HOSPADM

## 2019-12-29 RX ORDER — LACTULOSE 10 G/15ML
20 SOLUTION ORAL 3 TIMES DAILY
Qty: 1 BOTTLE | Refills: 3 | Status: SHIPPED | OUTPATIENT
Start: 2019-12-29

## 2019-12-29 RX ADMIN — METHADONE HYDROCHLORIDE 10 MG: 10 TABLET ORAL at 08:36

## 2019-12-29 RX ADMIN — RIFAXIMIN 550 MG: 550 TABLET ORAL at 08:36

## 2019-12-29 RX ADMIN — LACTULOSE 20 G: 20 SOLUTION ORAL at 08:36

## 2019-12-29 RX ADMIN — FUROSEMIDE 20 MG: 20 TABLET ORAL at 12:28

## 2019-12-29 RX ADMIN — HYDROXYZINE HYDROCHLORIDE 25 MG: 25 TABLET, FILM COATED ORAL at 00:01

## 2019-12-29 RX ADMIN — HEPARIN SODIUM 5000 UNITS: 5000 INJECTION INTRAVENOUS; SUBCUTANEOUS at 08:37

## 2019-12-29 RX ADMIN — Medication 10 ML: at 08:36

## 2019-12-29 RX ADMIN — SPIRONOLACTONE 50 MG: 50 TABLET ORAL at 12:28

## 2019-12-29 RX ADMIN — OXYCODONE 10 MG: 5 TABLET ORAL at 06:14

## 2019-12-29 RX ADMIN — Medication 3 MG: at 00:12

## 2019-12-29 ASSESSMENT — PAIN SCALES - GENERAL
PAINLEVEL_OUTOF10: 0
PAINLEVEL_OUTOF10: 6
PAINLEVEL_OUTOF10: 7

## 2019-12-29 ASSESSMENT — PAIN DESCRIPTION - PAIN TYPE
TYPE: CHRONIC PAIN
TYPE: CHRONIC PAIN

## 2019-12-29 ASSESSMENT — PAIN DESCRIPTION - DESCRIPTORS
DESCRIPTORS: ACHING
DESCRIPTORS: ACHING

## 2019-12-29 ASSESSMENT — PAIN DESCRIPTION - PROGRESSION
CLINICAL_PROGRESSION: NOT CHANGED
CLINICAL_PROGRESSION: NOT CHANGED

## 2019-12-29 ASSESSMENT — PAIN DESCRIPTION - FREQUENCY
FREQUENCY: CONTINUOUS
FREQUENCY: CONTINUOUS

## 2019-12-29 ASSESSMENT — PAIN DESCRIPTION - ORIENTATION
ORIENTATION: RIGHT;LEFT
ORIENTATION: RIGHT;LEFT

## 2019-12-29 ASSESSMENT — PAIN DESCRIPTION - LOCATION
LOCATION: LEG;BACK
LOCATION: BACK;LEG

## 2019-12-29 ASSESSMENT — PAIN DESCRIPTION - ONSET
ONSET: ON-GOING
ONSET: ON-GOING

## 2020-01-01 LAB
BLOOD CULTURE, ROUTINE: NORMAL
CULTURE, BLOOD 2: NORMAL

## 2020-07-12 ENCOUNTER — APPOINTMENT (OUTPATIENT)
Dept: GENERAL RADIOLOGY | Age: 60
DRG: 426 | End: 2020-07-12
Payer: COMMERCIAL

## 2020-07-12 ENCOUNTER — HOSPITAL ENCOUNTER (INPATIENT)
Age: 60
LOS: 2 days | Discharge: HOME | DRG: 426 | End: 2020-07-14
Attending: EMERGENCY MEDICINE | Admitting: INTERNAL MEDICINE
Payer: COMMERCIAL

## 2020-07-12 LAB
A/G RATIO: 0.7 (ref 1.1–2.2)
ALBUMIN SERPL-MCNC: 2.7 G/DL (ref 3.4–5)
ALP BLD-CCNC: 195 U/L (ref 40–129)
ALT SERPL-CCNC: 26 U/L (ref 10–40)
AMMONIA: 53 UMOL/L (ref 16–60)
ANION GAP SERPL CALCULATED.3IONS-SCNC: 8 MMOL/L (ref 3–16)
APTT: 39.2 SEC (ref 24.2–36.2)
AST SERPL-CCNC: 51 U/L (ref 15–37)
BASE EXCESS VENOUS: 1.2 MMOL/L (ref -2–3)
BASOPHILS ABSOLUTE: 0 K/UL (ref 0–0.2)
BASOPHILS RELATIVE PERCENT: 0.7 %
BILIRUB SERPL-MCNC: 0.9 MG/DL (ref 0–1)
BILIRUBIN URINE: NEGATIVE
BLOOD, URINE: ABNORMAL
BUN BLDV-MCNC: 15 MG/DL (ref 7–20)
CALCIUM SERPL-MCNC: 7.8 MG/DL (ref 8.3–10.6)
CARBOXYHEMOGLOBIN: 3 % (ref 0–1.5)
CHLORIDE BLD-SCNC: 95 MMOL/L (ref 99–110)
CLARITY: CLEAR
CO2: 24 MMOL/L (ref 21–32)
COLOR: YELLOW
CREAT SERPL-MCNC: 1.4 MG/DL (ref 0.9–1.3)
EOSINOPHILS ABSOLUTE: 0.1 K/UL (ref 0–0.6)
EOSINOPHILS RELATIVE PERCENT: 2.6 %
GFR AFRICAN AMERICAN: >60
GFR NON-AFRICAN AMERICAN: 52
GLOBULIN: 4.1 G/DL
GLUCOSE BLD-MCNC: 307 MG/DL (ref 70–99)
GLUCOSE URINE: 250 MG/DL
HCO3 VENOUS: 25.6 MMOL/L (ref 24–28)
HCT VFR BLD CALC: 34.9 % (ref 40.5–52.5)
HEMOGLOBIN, VEN, REDUCED: 6.7 %
HEMOGLOBIN: 11.7 G/DL (ref 13.5–17.5)
INR BLD: 1.34 (ref 0.86–1.14)
KETONES, URINE: NEGATIVE MG/DL
LEUKOCYTE ESTERASE, URINE: NEGATIVE
LYMPHOCYTES ABSOLUTE: 1 K/UL (ref 1–5.1)
LYMPHOCYTES RELATIVE PERCENT: 26.7 %
MCH RBC QN AUTO: 34 PG (ref 26–34)
MCHC RBC AUTO-ENTMCNC: 33.6 G/DL (ref 31–36)
MCV RBC AUTO: 101.1 FL (ref 80–100)
METHEMOGLOBIN VENOUS: 0.4 % (ref 0–1.5)
MICROSCOPIC EXAMINATION: YES
MONOCYTES ABSOLUTE: 0.4 K/UL (ref 0–1.3)
MONOCYTES RELATIVE PERCENT: 9.2 %
NEUTROPHILS ABSOLUTE: 2.3 K/UL (ref 1.7–7.7)
NEUTROPHILS RELATIVE PERCENT: 60.8 %
NITRITE, URINE: NEGATIVE
O2 SAT, VEN: 93 %
PCO2, VEN: 42.4 MMHG (ref 41–51)
PDW BLD-RTO: 14.1 % (ref 12.4–15.4)
PH UA: 6 (ref 5–8)
PH VENOUS: 7.4 (ref 7.35–7.45)
PLATELET # BLD: 72 K/UL (ref 135–450)
PMV BLD AUTO: 9.7 FL (ref 5–10.5)
PO2, VEN: 68.4 MMHG (ref 25–40)
POTASSIUM REFLEX MAGNESIUM: 4.9 MMOL/L (ref 3.5–5.1)
PRO-BNP: 140 PG/ML (ref 0–124)
PROTEIN UA: NEGATIVE MG/DL
PROTHROMBIN TIME: 15.6 SEC (ref 10–13.2)
RBC # BLD: 3.46 M/UL (ref 4.2–5.9)
RBC UA: NORMAL /HPF (ref 0–4)
SODIUM BLD-SCNC: 127 MMOL/L (ref 136–145)
SPECIFIC GRAVITY UA: 1.01 (ref 1–1.03)
TCO2 CALC VENOUS: 27 MMOL/L
TOTAL PROTEIN: 6.8 G/DL (ref 6.4–8.2)
URINE TYPE: ABNORMAL
UROBILINOGEN, URINE: 0.2 E.U./DL
WBC # BLD: 3.9 K/UL (ref 4–11)
WBC UA: NORMAL /HPF (ref 0–5)

## 2020-07-12 PROCEDURE — 80053 COMPREHEN METABOLIC PANEL: CPT

## 2020-07-12 PROCEDURE — 82140 ASSAY OF AMMONIA: CPT

## 2020-07-12 PROCEDURE — 1200000000 HC SEMI PRIVATE

## 2020-07-12 PROCEDURE — 83880 ASSAY OF NATRIURETIC PEPTIDE: CPT

## 2020-07-12 PROCEDURE — 82803 BLOOD GASES ANY COMBINATION: CPT

## 2020-07-12 PROCEDURE — 71045 X-RAY EXAM CHEST 1 VIEW: CPT

## 2020-07-12 PROCEDURE — 84443 ASSAY THYROID STIM HORMONE: CPT

## 2020-07-12 PROCEDURE — 93005 ELECTROCARDIOGRAM TRACING: CPT | Performed by: EMERGENCY MEDICINE

## 2020-07-12 PROCEDURE — 85610 PROTHROMBIN TIME: CPT

## 2020-07-12 PROCEDURE — 83036 HEMOGLOBIN GLYCOSYLATED A1C: CPT

## 2020-07-12 PROCEDURE — 85730 THROMBOPLASTIN TIME PARTIAL: CPT

## 2020-07-12 PROCEDURE — 6370000000 HC RX 637 (ALT 250 FOR IP): Performed by: EMERGENCY MEDICINE

## 2020-07-12 PROCEDURE — 81001 URINALYSIS AUTO W/SCOPE: CPT

## 2020-07-12 PROCEDURE — G0378 HOSPITAL OBSERVATION PER HR: HCPCS

## 2020-07-12 PROCEDURE — 36415 COLL VENOUS BLD VENIPUNCTURE: CPT

## 2020-07-12 PROCEDURE — 99284 EMERGENCY DEPT VISIT MOD MDM: CPT

## 2020-07-12 PROCEDURE — 2580000003 HC RX 258: Performed by: EMERGENCY MEDICINE

## 2020-07-12 PROCEDURE — 96365 THER/PROPH/DIAG IV INF INIT: CPT

## 2020-07-12 PROCEDURE — 85025 COMPLETE CBC W/AUTO DIFF WBC: CPT

## 2020-07-12 RX ORDER — PANTOPRAZOLE SODIUM 40 MG/1
40 TABLET, DELAYED RELEASE ORAL NIGHTLY
Status: DISCONTINUED | OUTPATIENT
Start: 2020-07-12 | End: 2020-07-14 | Stop reason: HOSPADM

## 2020-07-12 RX ORDER — METHADONE HYDROCHLORIDE 5 MG/1
10 TABLET ORAL 2 TIMES DAILY
Status: DISCONTINUED | OUTPATIENT
Start: 2020-07-12 | End: 2020-07-14 | Stop reason: HOSPADM

## 2020-07-12 RX ORDER — ACETAMINOPHEN 650 MG/1
650 SUPPOSITORY RECTAL EVERY 6 HOURS PRN
Status: DISCONTINUED | OUTPATIENT
Start: 2020-07-12 | End: 2020-07-14 | Stop reason: HOSPADM

## 2020-07-12 RX ORDER — INSULIN LISPRO 100 [IU]/ML
0-6 INJECTION, SOLUTION INTRAVENOUS; SUBCUTANEOUS
Status: DISCONTINUED | OUTPATIENT
Start: 2020-07-13 | End: 2020-07-14 | Stop reason: HOSPADM

## 2020-07-12 RX ORDER — ALBUMIN (HUMAN) 12.5 G/50ML
25 SOLUTION INTRAVENOUS ONCE
Status: COMPLETED | OUTPATIENT
Start: 2020-07-12 | End: 2020-07-13

## 2020-07-12 RX ORDER — DEXTROSE MONOHYDRATE 50 MG/ML
100 INJECTION, SOLUTION INTRAVENOUS PRN
Status: DISCONTINUED | OUTPATIENT
Start: 2020-07-12 | End: 2020-07-14 | Stop reason: HOSPADM

## 2020-07-12 RX ORDER — PROPRANOLOL HYDROCHLORIDE 10 MG/1
10 TABLET ORAL 3 TIMES DAILY
Status: DISCONTINUED | OUTPATIENT
Start: 2020-07-13 | End: 2020-07-14 | Stop reason: HOSPADM

## 2020-07-12 RX ORDER — INSULIN LISPRO 100 [IU]/ML
0-3 INJECTION, SOLUTION INTRAVENOUS; SUBCUTANEOUS NIGHTLY
Status: DISCONTINUED | OUTPATIENT
Start: 2020-07-12 | End: 2020-07-14 | Stop reason: HOSPADM

## 2020-07-12 RX ORDER — LACTULOSE 10 G/15ML
20 SOLUTION ORAL 3 TIMES DAILY
Status: DISCONTINUED | OUTPATIENT
Start: 2020-07-13 | End: 2020-07-14 | Stop reason: HOSPADM

## 2020-07-12 RX ORDER — GABAPENTIN 300 MG/1
300 CAPSULE ORAL DAILY
Status: DISCONTINUED | OUTPATIENT
Start: 2020-07-13 | End: 2020-07-14 | Stop reason: HOSPADM

## 2020-07-12 RX ORDER — ACETAMINOPHEN 325 MG/1
650 TABLET ORAL EVERY 6 HOURS PRN
Status: DISCONTINUED | OUTPATIENT
Start: 2020-07-12 | End: 2020-07-14 | Stop reason: HOSPADM

## 2020-07-12 RX ORDER — HYDROXYZINE HYDROCHLORIDE 10 MG/1
25 TABLET, FILM COATED ORAL EVERY 6 HOURS PRN
Status: DISCONTINUED | OUTPATIENT
Start: 2020-07-12 | End: 2020-07-14 | Stop reason: HOSPADM

## 2020-07-12 RX ORDER — OXYCODONE HYDROCHLORIDE 5 MG/1
10 TABLET ORAL EVERY 6 HOURS PRN
Status: DISCONTINUED | OUTPATIENT
Start: 2020-07-12 | End: 2020-07-14 | Stop reason: HOSPADM

## 2020-07-12 RX ORDER — SODIUM CHLORIDE 0.9 % (FLUSH) 0.9 %
10 SYRINGE (ML) INJECTION PRN
Status: DISCONTINUED | OUTPATIENT
Start: 2020-07-12 | End: 2020-07-14 | Stop reason: HOSPADM

## 2020-07-12 RX ORDER — NICOTINE POLACRILEX 4 MG
15 LOZENGE BUCCAL PRN
Status: DISCONTINUED | OUTPATIENT
Start: 2020-07-12 | End: 2020-07-14 | Stop reason: HOSPADM

## 2020-07-12 RX ORDER — PROMETHAZINE HYDROCHLORIDE 25 MG/1
12.5 TABLET ORAL EVERY 6 HOURS PRN
Status: DISCONTINUED | OUTPATIENT
Start: 2020-07-12 | End: 2020-07-14 | Stop reason: HOSPADM

## 2020-07-12 RX ORDER — OXYCODONE HYDROCHLORIDE 5 MG/1
5 TABLET ORAL ONCE
Status: COMPLETED | OUTPATIENT
Start: 2020-07-12 | End: 2020-07-12

## 2020-07-12 RX ORDER — DEXTROSE MONOHYDRATE 25 G/50ML
12.5 INJECTION, SOLUTION INTRAVENOUS PRN
Status: DISCONTINUED | OUTPATIENT
Start: 2020-07-12 | End: 2020-07-14 | Stop reason: HOSPADM

## 2020-07-12 RX ORDER — SODIUM CHLORIDE 9 MG/ML
1000 INJECTION, SOLUTION INTRAVENOUS CONTINUOUS
Status: DISCONTINUED | OUTPATIENT
Start: 2020-07-12 | End: 2020-07-13

## 2020-07-12 RX ORDER — SODIUM CHLORIDE 0.9 % (FLUSH) 0.9 %
10 SYRINGE (ML) INJECTION EVERY 12 HOURS SCHEDULED
Status: DISCONTINUED | OUTPATIENT
Start: 2020-07-12 | End: 2020-07-14 | Stop reason: HOSPADM

## 2020-07-12 RX ORDER — POLYETHYLENE GLYCOL 3350 17 G/17G
17 POWDER, FOR SOLUTION ORAL DAILY PRN
Status: DISCONTINUED | OUTPATIENT
Start: 2020-07-12 | End: 2020-07-14 | Stop reason: HOSPADM

## 2020-07-12 RX ORDER — ONDANSETRON 2 MG/ML
4 INJECTION INTRAMUSCULAR; INTRAVENOUS EVERY 6 HOURS PRN
Status: DISCONTINUED | OUTPATIENT
Start: 2020-07-12 | End: 2020-07-14 | Stop reason: HOSPADM

## 2020-07-12 RX ADMIN — SODIUM CHLORIDE 1000 ML: 0.9 INJECTION, SOLUTION INTRAVENOUS at 22:37

## 2020-07-12 RX ADMIN — OXYCODONE 5 MG: 5 TABLET ORAL at 20:10

## 2020-07-12 RX ADMIN — OXYCODONE 5 MG: 5 TABLET ORAL at 20:26

## 2020-07-12 ASSESSMENT — PAIN SCALES - GENERAL
PAINLEVEL_OUTOF10: 0
PAINLEVEL_OUTOF10: 0
PAINLEVEL_OUTOF10: 6

## 2020-07-13 LAB
A/G RATIO: 0.8 (ref 1.1–2.2)
ALBUMIN SERPL-MCNC: 2.8 G/DL (ref 3.4–5)
ALP BLD-CCNC: 155 U/L (ref 40–129)
ALT SERPL-CCNC: 21 U/L (ref 10–40)
ANION GAP SERPL CALCULATED.3IONS-SCNC: 10 MMOL/L (ref 3–16)
AST SERPL-CCNC: 43 U/L (ref 15–37)
BASOPHILS ABSOLUTE: 0 K/UL (ref 0–0.2)
BASOPHILS RELATIVE PERCENT: 0 %
BILIRUB SERPL-MCNC: 1 MG/DL (ref 0–1)
BUN BLDV-MCNC: 15 MG/DL (ref 7–20)
CALCIUM SERPL-MCNC: 7.9 MG/DL (ref 8.3–10.6)
CHLORIDE BLD-SCNC: 96 MMOL/L (ref 99–110)
CO2: 24 MMOL/L (ref 21–32)
CREAT SERPL-MCNC: 1.5 MG/DL (ref 0.9–1.3)
EKG ATRIAL RATE: 55 BPM
EKG DIAGNOSIS: NORMAL
EKG P AXIS: 19 DEGREES
EKG P-R INTERVAL: 158 MS
EKG Q-T INTERVAL: 428 MS
EKG QRS DURATION: 74 MS
EKG QTC CALCULATION (BAZETT): 409 MS
EKG R AXIS: 9 DEGREES
EKG T AXIS: 10 DEGREES
EKG VENTRICULAR RATE: 55 BPM
EOSINOPHILS ABSOLUTE: 0 K/UL (ref 0–0.6)
EOSINOPHILS RELATIVE PERCENT: 0 %
ESTIMATED AVERAGE GLUCOSE: 200.1 MG/DL
GFR AFRICAN AMERICAN: 58
GFR NON-AFRICAN AMERICAN: 48
GLOBULIN: 3.6 G/DL
GLUCOSE BLD-MCNC: 167 MG/DL (ref 70–99)
GLUCOSE BLD-MCNC: 179 MG/DL (ref 70–99)
GLUCOSE BLD-MCNC: 184 MG/DL (ref 70–99)
GLUCOSE BLD-MCNC: 215 MG/DL (ref 70–99)
GLUCOSE BLD-MCNC: 216 MG/DL (ref 70–99)
GLUCOSE BLD-MCNC: 250 MG/DL (ref 70–99)
HBA1C MFR BLD: 8.6 %
HCT VFR BLD CALC: 31.6 % (ref 40.5–52.5)
HEMOGLOBIN: 10.8 G/DL (ref 13.5–17.5)
LYMPHOCYTES ABSOLUTE: 1.5 K/UL (ref 1–5.1)
LYMPHOCYTES RELATIVE PERCENT: 52 %
MACROCYTES: ABNORMAL
MCH RBC QN AUTO: 34.6 PG (ref 26–34)
MCHC RBC AUTO-ENTMCNC: 34 G/DL (ref 31–36)
MCV RBC AUTO: 101.7 FL (ref 80–100)
MONOCYTES ABSOLUTE: 0.2 K/UL (ref 0–1.3)
MONOCYTES RELATIVE PERCENT: 7 %
NEUTROPHILS ABSOLUTE: 1.2 K/UL (ref 1.7–7.7)
NEUTROPHILS RELATIVE PERCENT: 41 %
OSMOLALITY URINE: 223 MOSM/KG (ref 390–1070)
OSMOLALITY: 289 MOSM/KG (ref 278–305)
PDW BLD-RTO: 14 % (ref 12.4–15.4)
PERFORMED ON: ABNORMAL
PLATELET # BLD: 50 K/UL (ref 135–450)
PLATELET SLIDE REVIEW: ABNORMAL
PMV BLD AUTO: 9.2 FL (ref 5–10.5)
POTASSIUM REFLEX MAGNESIUM: 4.5 MMOL/L (ref 3.5–5.1)
RBC # BLD: 3.11 M/UL (ref 4.2–5.9)
SODIUM BLD-SCNC: 130 MMOL/L (ref 136–145)
SODIUM URINE: <20 MMOL/L
TOTAL PROTEIN: 6.4 G/DL (ref 6.4–8.2)
TSH REFLEX: 2.07 UIU/ML (ref 0.27–4.2)
WBC # BLD: 2.9 K/UL (ref 4–11)

## 2020-07-13 PROCEDURE — 84300 ASSAY OF URINE SODIUM: CPT

## 2020-07-13 PROCEDURE — 6360000002 HC RX W HCPCS: Performed by: INTERNAL MEDICINE

## 2020-07-13 PROCEDURE — 1200000000 HC SEMI PRIVATE

## 2020-07-13 PROCEDURE — 85025 COMPLETE CBC W/AUTO DIFF WBC: CPT

## 2020-07-13 PROCEDURE — 6370000000 HC RX 637 (ALT 250 FOR IP): Performed by: INTERNAL MEDICINE

## 2020-07-13 PROCEDURE — 83930 ASSAY OF BLOOD OSMOLALITY: CPT

## 2020-07-13 PROCEDURE — 96367 TX/PROPH/DG ADDL SEQ IV INF: CPT

## 2020-07-13 PROCEDURE — 36415 COLL VENOUS BLD VENIPUNCTURE: CPT

## 2020-07-13 PROCEDURE — 83935 ASSAY OF URINE OSMOLALITY: CPT

## 2020-07-13 PROCEDURE — P9047 ALBUMIN (HUMAN), 25%, 50ML: HCPCS | Performed by: INTERNAL MEDICINE

## 2020-07-13 PROCEDURE — G0378 HOSPITAL OBSERVATION PER HR: HCPCS

## 2020-07-13 PROCEDURE — 2580000003 HC RX 258: Performed by: INTERNAL MEDICINE

## 2020-07-13 PROCEDURE — 96366 THER/PROPH/DIAG IV INF ADDON: CPT

## 2020-07-13 PROCEDURE — 80053 COMPREHEN METABOLIC PANEL: CPT

## 2020-07-13 RX ORDER — SODIUM CHLORIDE 9 MG/ML
1000 INJECTION, SOLUTION INTRAVENOUS CONTINUOUS
Status: DISCONTINUED | OUTPATIENT
Start: 2020-07-13 | End: 2020-07-13

## 2020-07-13 RX ORDER — SODIUM CHLORIDE 9 MG/ML
1000 INJECTION, SOLUTION INTRAVENOUS CONTINUOUS
Status: DISCONTINUED | OUTPATIENT
Start: 2020-07-13 | End: 2020-07-14

## 2020-07-13 RX ORDER — ALBUMIN (HUMAN) 12.5 G/50ML
25 SOLUTION INTRAVENOUS EVERY 8 HOURS
Status: COMPLETED | OUTPATIENT
Start: 2020-07-13 | End: 2020-07-14

## 2020-07-13 RX ADMIN — RIFAXIMIN 550 MG: 550 TABLET ORAL at 00:13

## 2020-07-13 RX ADMIN — INSULIN GLARGINE 11 UNITS: 100 INJECTION, SOLUTION SUBCUTANEOUS at 22:19

## 2020-07-13 RX ADMIN — ALBUMIN (HUMAN) 25 G: 0.25 INJECTION, SOLUTION INTRAVENOUS at 17:04

## 2020-07-13 RX ADMIN — LACTULOSE 20 G: 20 SOLUTION ORAL at 17:05

## 2020-07-13 RX ADMIN — INSULIN LISPRO 1 UNITS: 100 INJECTION, SOLUTION INTRAVENOUS; SUBCUTANEOUS at 00:44

## 2020-07-13 RX ADMIN — LACTULOSE 20 G: 20 SOLUTION ORAL at 22:21

## 2020-07-13 RX ADMIN — RIFAXIMIN 550 MG: 550 TABLET ORAL at 07:49

## 2020-07-13 RX ADMIN — INSULIN LISPRO 1 UNITS: 100 INJECTION, SOLUTION INTRAVENOUS; SUBCUTANEOUS at 07:53

## 2020-07-13 RX ADMIN — PANTOPRAZOLE SODIUM 40 MG: 40 TABLET, DELAYED RELEASE ORAL at 00:42

## 2020-07-13 RX ADMIN — INSULIN LISPRO 1 UNITS: 100 INJECTION, SOLUTION INTRAVENOUS; SUBCUTANEOUS at 12:37

## 2020-07-13 RX ADMIN — SODIUM CHLORIDE 1000 ML: 9 INJECTION, SOLUTION INTRAVENOUS at 22:26

## 2020-07-13 RX ADMIN — OXYCODONE 10 MG: 5 TABLET ORAL at 09:47

## 2020-07-13 RX ADMIN — METHADONE HYDROCHLORIDE 10 MG: 5 TABLET ORAL at 07:52

## 2020-07-13 RX ADMIN — SODIUM CHLORIDE 1000 ML: 9 INJECTION, SOLUTION INTRAVENOUS at 17:05

## 2020-07-13 RX ADMIN — INSULIN LISPRO 2 UNITS: 100 INJECTION, SOLUTION INTRAVENOUS; SUBCUTANEOUS at 18:26

## 2020-07-13 RX ADMIN — INSULIN GLARGINE 11 UNITS: 100 INJECTION, SOLUTION SUBCUTANEOUS at 00:43

## 2020-07-13 RX ADMIN — INSULIN LISPRO 1 UNITS: 100 INJECTION, SOLUTION INTRAVENOUS; SUBCUTANEOUS at 22:20

## 2020-07-13 RX ADMIN — ALBUMIN (HUMAN) 25 G: 0.25 INJECTION, SOLUTION INTRAVENOUS at 01:18

## 2020-07-13 RX ADMIN — LACTULOSE 20 G: 20 SOLUTION ORAL at 07:49

## 2020-07-13 RX ADMIN — METHADONE HYDROCHLORIDE 10 MG: 5 TABLET ORAL at 22:21

## 2020-07-13 RX ADMIN — METHADONE HYDROCHLORIDE 10 MG: 5 TABLET ORAL at 00:12

## 2020-07-13 RX ADMIN — GABAPENTIN 300 MG: 300 CAPSULE ORAL at 07:52

## 2020-07-13 RX ADMIN — OXYCODONE 10 MG: 5 TABLET ORAL at 19:40

## 2020-07-13 RX ADMIN — RIFAXIMIN 550 MG: 550 TABLET ORAL at 22:21

## 2020-07-13 RX ADMIN — PANTOPRAZOLE SODIUM 40 MG: 40 TABLET, DELAYED RELEASE ORAL at 22:21

## 2020-07-13 ASSESSMENT — PAIN DESCRIPTION - LOCATION
LOCATION: BACK

## 2020-07-13 ASSESSMENT — PAIN DESCRIPTION - ONSET
ONSET: ON-GOING

## 2020-07-13 ASSESSMENT — PAIN SCALES - GENERAL
PAINLEVEL_OUTOF10: 6
PAINLEVEL_OUTOF10: 7
PAINLEVEL_OUTOF10: 7
PAINLEVEL_OUTOF10: 3
PAINLEVEL_OUTOF10: 5
PAINLEVEL_OUTOF10: 0
PAINLEVEL_OUTOF10: 6
PAINLEVEL_OUTOF10: 5

## 2020-07-13 ASSESSMENT — PAIN DESCRIPTION - PAIN TYPE
TYPE: CHRONIC PAIN

## 2020-07-13 ASSESSMENT — PAIN DESCRIPTION - ORIENTATION
ORIENTATION: MID
ORIENTATION: MID

## 2020-07-13 ASSESSMENT — PAIN DESCRIPTION - DESCRIPTORS
DESCRIPTORS: ACHING

## 2020-07-13 ASSESSMENT — PAIN DESCRIPTION - FREQUENCY
FREQUENCY: CONTINUOUS

## 2020-07-13 ASSESSMENT — PAIN DESCRIPTION - PROGRESSION
CLINICAL_PROGRESSION: NOT CHANGED

## 2020-07-13 NOTE — PROGRESS NOTES
Pt alert and oriented. VSS. Propranolol held due to low bp and hr. MD notified. Pt has BLE swelling  R>L. RLE +3 pitting and LLE +2 pitting. Pt c/o pain in back, scheduled methadone and gabapentin administered. Tolerating diet. Denies any nausea or vomiting. Calls out appropriately. Call light in reach. Will continue to monitor.  Electronically signed by Simeon Torres RN on 7/13/2020 at 9:04 AM

## 2020-07-13 NOTE — PROGRESS NOTES
RN spoke with Dr. Mariposa Dobbs, will be in to see pt later this afternoon.  Electronically signed by Gonzales Harris RN on 7/13/2020 at 9:20 AM

## 2020-07-13 NOTE — CARE COORDINATION
Case Management Assessment           Initial Evaluation                Date / Time of Evaluation: 7/13/2020 5:01 PM                 Assessment Completed by: Cecile Fishman    Patient Name: Elke Lee     YOB: 1960  Diagnosis: Hyponatremia [E87.1]  Hyponatremia [E87.1]     Date / Time: 7/12/2020  7:06 PM    Patient Admission Status: Inpatient    If patient is discharged prior to next notation, then this note serves as note for discharge by case management. Current PCP: Boaz Flynn Patient: No    Chart Reviewed: Yes  Patient/ Family Interviewed: Yes    Initial assessment completed at bedside with: pt    Hospitalization in the last 30 days: No    Emergency Contacts:  Extended Emergency Contact Information  Primary Emergency Contact: [facility]  Address: [place] [address] [address] [place] [address] Phone: [phone]  Relation: Niece/Nephew  Secondary Emergency Contact: [address] [place] [address] Phone: [phone]  Mobile Phone: [phone]  Relation: Brother/Sister    Advance Directives:   Code Status: Full [address]: No    Financial  Payor: Greg Hussein / Plan: Boni Sharma / Product Type: *No Product type* /     Pre-cert required for SNF: Yes    Pharmacy    [facility], [address] [place] [phone] - F [phone]  [address] [facility] [address]  Phone: [phone] Fax: [address] [facility] [address] Judie  [phone] [facility] [phone]  [address] [address]  Phone: [phone] Fax: [phone]      Potential assistance Purchasing Medications: Potential Assistance Purchasing Medications: No  Does Patient want to participate in local refill/ meds to beds program?: No    Meds To Beds General Rules:  1. Can ONLY be done Monday- Friday between 8:30am-5pm  2.  Prescription(s) must be in pharmacy by 3pm to be filled same day  3. Copy of patient's insurance/ prescription drug card and patient face sheet must be sent along with the prescription(s)  4. Cost of Rx cannot be added to hospital bill. If financial assistance is needed, please contact unit  or ;  or  CANNOT provide pharmacy voucher for patients co-pays  5. Patients can then  the prescription on their way out of the hospital at discharge, or pharmacy can deliver to the bedside if staff is available. (payment due at time of pick-up or delivery - cash, check, or card accepted)     Able to afford home medications/ co-pay costs: Yes    ADLS  Support Systems: Family Members    PT AM-PAC:   /24  OT AM-PAC:   /24    [place]: from home independent  Steps:     Plans to RETURN to current housing: Yes  Barriers to RETURNING to current housing: none    [address]  Currently ACTIVE with [facility]: No  Home Care Agency: Not Applicable    Currently ACTIVE with [facility]: No      Durable Medical Equipment  DME Provider: none  Equipment:     Home Oxygen and [address] prior to admission: No  [address]: Not Applicable  Other Respiratory Equipment:       Dialysis  Active with HD/PD prior to admission: No  Nephrologist:     HD Center:  Not Applicable    DISCHARGE PLAN:  Disposition: Home- No Services Needed    Transportation PLAN for discharge: family     Factors facilitating achievement of predicted outcomes: Cooperative and Pleasant    Barriers to discharge: none noted    Additional Case Management Notes: pt from home independent, plans to return no needs. Pending neph consult.      The Plan for Transition of Care is related to the following treatment goals of Hyponatremia [E87.1]  Hyponatremia [E87.1]    The Patient and/or patient representative Louise Isidro and his family were provided with a choice of provider and agrees with the

## 2020-07-13 NOTE — ED PROVIDER NOTES
[address]          ATTENDING PHYSICIAN NOTE       Date of evaluation: 7/12/2020    Chief Complaint     Leg Swelling      History of Present Illness     Bhavna Kaplan is a 63 y.o. male who presents with reported leg swelling. He reports that is been building for some time, worsening especially over the last 4 days or so. He reports he has not spoken to his hepatologist regarding this, he usually takes care of related symptoms. He reports occasional feeling of shortness of breath as he sitting still, is not constant, and is relieved with taking a big breath. No fever. No cough. No abdominal pain. He says he feels he is not eating or drinking like he should, did start taking more lactulose and is having some more soft stool than he normally would previously. No change in urinary habits. No confusion, but the patient says he feels like if he had not started taking the lactulose that he would have gotten confused within a few days. Review of Systems     Review of Systems  Pertinent positives and negatives are listed in the HPI; otherwise all systems are reviewed and were negative. Past Medical, Surgical, Family, and Social History     He has a past medical history of Chronic hepatitis C ([address] Diabetes mellitus ([address] Herniated nucleus pulposus, L4-5, and Hypertension. He has a past surgical history that includes Femur fracture surgery. His family history is not on file. He reports that he has never smoked. He has never used smokeless tobacco. He reports that he does not drink alcohol or use drugs. Medications     Previous Medications    ERGOCALCIFEROL (ERGOCALCIFEROL) 1.25 MG (29897 UT) CAPSULE    Take 50,000 Units by mouth once a week On Fridays    FUROSEMIDE (LASIX) 20 MG TABLET    Take 20 mg by mouth 2 times daily     GABAPENTIN (NEURONTIN) 300 MG CAPSULE    Take 300 mg by mouth daily.      HYDROXYZINE (ATARAX) 25 MG TABLET    Take 25 mg by mouth every 6 hours as needed for Itching or Anxiety    LACTULOSE (CHRONULAC) 10 GM/15ML SOLUTION    Take 30 mLs by mouth 3 times daily    METHADONE (DOLOPHINE) 10 MG TABLET    Take 10 mg by mouth 2 times daily     OXYCODONE HCL (OXY-IR) 10 MG IMMEDIATE RELEASE TABLET    Take 10 mg by mouth every 6 hours as needed for Pain. PANTOPRAZOLE (PROTONIX) 40 MG TABLET    Take 40 mg by mouth nightly     PROPRANOLOL (INDERAL) 10 MG TABLET    Take 10 mg by mouth nightly     RIFAXIMIN (XIFAXAN) 550 MG TABLET    Take 1 tablet by mouth 2 times daily    SENNOSIDES-DOCUSATE SODIUM (SENOKOT-S) 8.6-50 MG TABLET    Take 2 tablets by mouth nightly    SPIRONOLACTONE (ALDACTONE) 50 MG TABLET    Take 50 mg by mouth daily       Allergies     He is allergic to fentanyl and icosapent ethyl. Physical Exam     INITIAL VITALS: BP: (!) 160/75, Temp: 98.4 °F (36.9 °C), Pulse: 75, Resp: 16, SpO2: 98 %   Physical Exam  Constitutional:       Appearance: Normal appearance. HENT:      Head: Normocephalic and atraumatic. Cardiovascular:      Rate and Rhythm: Normal rate. Pulses: Normal pulses. Pulmonary:      Effort: Pulmonary effort is normal. No respiratory distress. Breath sounds: No wheezing or rales. Chest:      Chest wall: No tenderness. Abdominal:      General: There is no distension. Palpations: Abdomen is soft. Tenderness: There is no abdominal tenderness. There is no guarding. Musculoskeletal:         General: Swelling (2+ BLE to knee) present. No tenderness. Skin:     General: Skin is warm and dry. Neurological:      General: No focal deficit present. Mental Status: He is alert and oriented to person, place, and time. Mental status is at baseline. Motor: No weakness.       Comments: Occasional repetitive questions         Diagnostic Results     EKG   Sinus bradycardia, rate 55 beats per, SC interval 158 ms, QRS duration 74 ms,     RADIOLOGY:  XR CHEST PORTABLE   Final Result      No acute radiographic abnormality of the chest.          LABS:   Results for orders placed or performed during the hospital encounter of 07/12/20   CBC Auto Differential   Result Value Ref Range    WBC 3.9 (L) 4.0 - 11.0 K/uL    RBC 3.46 (L) 4.20 - 5.90 M/uL    Hemoglobin 11.7 (L) 13.5 - 17.5 g/dL    Hematocrit 34.9 (L) 40.5 - 52.5 %    .1 (H) 80.0 - 100.0 fL    MCH 34.0 26.0 - 34.0 pg    MCHC 33.6 31.0 - 36.0 g/dL    RDW 14.1 12.4 - 15.4 %    Platelets 72 (L) 058 - 450 K/uL    MPV 9.7 5.0 - 10.5 fL    Neutrophils % 60.8 %    Lymphocytes % 26.7 %    Monocytes % 9.2 %    Eosinophils % 2.6 %    Basophils % 0.7 %    Neutrophils Absolute 2.3 1.7 - 7.7 K/uL    Lymphocytes Absolute 1.0 1.0 - 5.1 K/uL    Monocytes Absolute 0.4 0.0 - 1.3 K/uL    Eosinophils Absolute 0.1 0.0 - 0.6 K/uL    Basophils Absolute 0.0 0.0 - 0.2 K/uL   Comprehensive Metabolic Panel w/ Reflex to MG   Result Value Ref Range    Sodium 127 (L) 136 - 145 mmol/L    Potassium reflex Magnesium 4.9 3.5 - 5.1 mmol/L    Chloride 95 (L) 99 - 110 mmol/L    CO2 24 21 - 32 mmol/L    Anion Gap 8 3 - 16    Glucose 307 (H) 70 - 99 mg/dL    BUN 15 7 - 20 mg/dL    CREATININE 1.4 (H) 0.9 - 1.3 mg/dL    GFR Non-African American 52 (A) >60    GFR African American >60 >60    Calcium 7.8 (L) 8.3 - 10.6 mg/dL    Total Protein 6.8 6.4 - 8.2 g/dL    Alb 2.7 (L) 3.4 - 5.0 g/dL    Albumin/Globulin Ratio 0.7 (L) 1.1 - 2.2    Total Bilirubin 0.9 0.0 - 1.0 mg/dL    Alkaline Phosphatase 195 (H) 40 - 129 U/L    ALT 26 10 - 40 U/L    AST 51 (H) 15 - 37 U/L    Globulin 4.1 g/dL   Urinalysis, reflex to microscopic   Result Value Ref Range    Color, UA Yellow Straw/Yellow    Clarity, UA Clear Clear    Glucose, Ur 250 (A) Negative mg/dL    Bilirubin Urine Negative Negative    Ketones, Urine Negative Negative mg/dL    Specific Gravity, UA 1.010 1.005 - 1.030    Blood, Urine MODERATE (A) Negative    pH, UA 6.0 5.0 - 8.0    Protein, UA Negative Negative mg/dL    Urobilinogen, Urine 0.2 <2.0 E.U./dL    Nitrite, Urine Negative Negative    Leukocyte Esterase, Urine Negative Negative    Microscopic Examination YES     Urine Type Voided    Blood Gas, Venous   Result Value Ref Range    pH, Yuri 7.398 7.350 - 7.450    pCO2, Yuri 42.4 41.0 - 51.0 mmHg    pO2, Yuri 68.4 (H) 25.0 - 40.0 mmHg    HCO3, Venous 25.6 24.0 - 28.0 mmol/L    Base Excess, Yuri 1.2 -2.0 - 3.0 mmol/L    O2 Sat, Yuri 93 Not established %    Carboxyhemoglobin 3.0 (H) 0.0 - 1.5 %    MetHgb, Yuri 0.4 0.0 - 1.5 %    TC02 (Calc), Yuri 27 mmol/L    Hemoglobin, Yuri, Reduced 6.70 %   Protime-INR   Result Value Ref Range    Protime 15.6 (H) 10.0 - 13.2 sec    INR 1.34 (H) 0.86 - 1.14   APTT   Result Value Ref Range    aPTT 39.2 (H) 24.2 - 36.2 sec   Ammonia   Result Value Ref Range    Ammonia 53 16 - 60 umol/L   Brain Natriuretic Peptide   Result Value Ref Range    Pro- (H) 0 - 124 pg/mL   Microscopic Urinalysis   Result Value Ref Range    WBC, UA None seen 0 - 5 /HPF    RBC, UA 3-4 0 - 4 /HPF       ED BEDSIDE ULTRASOUND:      RECENT VITALS:  BP: (!) 160/75,Temp: 98.4 °F (36.9 °C), Pulse: 70, Resp: 18, SpO2: 96 %     Procedures         ED Course     Nursing Notes, Past Medical Hx, Past Surgical Hx, Social Hx,Allergies, and Family Hx were reviewed. patient was given the following medications:  Orders Placed This Encounter   Medications    oxyCODONE (ROXICODONE) immediate release tablet 5 mg    oxyCODONE (ROXICODONE) immediate release tablet 5 mg    0.9 % sodium chloride infusion       CONSULTS:  IP CONSULT TO PRIMARY CARE PROVIDER  IP CONSULT TO GI  IP CONSULT TO HOSPITALIST    MEDICAL DECISIONMAKING / ASSESSMENT / Tiarasean Sprague is a 63 y.o. male who presents with complaint of swelling in his legs, general feeling of fatigue and general feeling of being unwell. Basic lab work-up was obtained which shows some moderate hyponatremia with sodium 127, little bit of elevation in creatinine at 1.4.   Patient also has elevated blood glucose at 307, without acidosis or anion gap or ketones. No sign of infection. Ammonia within normal limits. EKG shows sinus rhythm. Abdomen is soft and nontender, the patient is afebrile. Discussed findings with the on call physician for patient's PCP and she discuss possibility of starting Actos for hyperglycemia, emptied to reach his primary gastroenterologist to get a recommendation on treatment of his hyponatremia in the setting of diuretic use with his leg swelling, and were unable to get that provider to call back. I also discussed the findings with the patient, potential plan for follow-up with his PCP, and both the patient and his family member who primarily takes care of him are very reticent about going home given that he has had similar symptoms in the past with subsequent significant worsening. Additionally his hyponatremia and elevated creatinine are likely due to diuretic use, though his lower extremity edema and discomfort is his primary concern, and fluid restriction or increased diuresis seems likely to exacerbate renal function, while decreasing diuretic use would seem to likely increase LE edema and make his presenting symptoms worse. As I'm unable to put together a plan which would appear to safely address his multiple issues and as the patient and his family are concerned about the safety of going home, I spoke to the hospitalist regarding admission for glycemic control, resolution of hyponatremia, and IVF. Clinical Impression     1. Hyponatremia    2. Hyperglycemia    3.  Leg swelling        Disposition     PATIENT REFERRED TO:  [address]  [address] [facility] [address]  [phone]            DISCHARGE MEDICATIONS:  New Prescriptions    No medications on file       DISPOSITION           Emmy Montez MD  07/12/20 2558

## 2020-07-13 NOTE — PROGRESS NOTES
Hospitalist Progress Note      PCP: Dayanara Guadarrama    Date of Admission: 7/12/2020    Chief Complaint: Fatigue and worsening lower extremity swelling    Hospital Course: 63 y.o. male who presents with complaints of worsening leg swelling and fatigue. Patient states that his leg swelling has been getting worse over the past 4 to 5 days. Patient is on 2 diuretics. Feels short of breath intermittently. Subjective: No acute events reported overnight. Patient denies any new concerns. Medications:  Reviewed    Infusion Medications    sodium chloride Stopped (07/13/20 0947)    dextrose       Scheduled Medications    gabapentin  300 mg Oral Daily    lactulose  20 g Oral TID    methadone  10 mg Oral BID    pantoprazole  40 mg Oral Nightly    propranolol  10 mg Oral TID    rifaximin  550 mg Oral BID    sodium chloride flush  10 mL Intravenous 2 times per day    insulin glargine  0.15 Units/kg Subcutaneous Nightly    insulin lispro  0-6 Units Subcutaneous TID WC    insulin lispro  0-3 Units Subcutaneous Nightly     PRN Meds: hydrOXYzine, oxyCODONE HCl, sodium chloride flush, acetaminophen **OR** acetaminophen, polyethylene glycol, promethazine **OR** ondansetron, glucose, dextrose, glucagon (rDNA), dextrose      Intake/Output Summary (Last 24 hours) at 7/13/2020 1413  Last data filed at 7/13/2020 0947  Gross per 24 hour   Intake 240 ml   Output --   Net 240 ml       Physical Exam Performed:    /65   Pulse 57   Temp 98.1 °F (36.7 °C) (Oral)   Resp 18   Ht 5' 11\" (1.803 m)   Wt 165 lb (74.8 kg)   SpO2 96%   BMI 23.01 kg/m²      General appearance:  No apparent distress, appears stated age and cooperative. HEENT:  Normal cephalic, atraumatic without obvious deformity. Pupils equal, round, and reactive to light. Extra ocular muscles intact. Conjunctivae/corneas clear. Neck: Supple, with full range of motion. No jugular venous distention. Trachea midline.   Respiratory:  Normal respiratory effort. Clear to auscultation, bilaterally without Rales/Wheezes/Rhonchi. Cardiovascular:  Regular rate and rhythm with normal S1/S2 without murmurs, rubs or gallops. Abdomen: Soft, non-tender, non-distended with normal bowel sounds. Musculoskeletal:  No clubbing, cyanosis. 2 + b/l LE edema  Skin: Skin color, texture, turgor normal.  No rashes or lesions. Neurologic:  Neurovascularly intact without any focal sensory/motor deficits. Cranial nerves: II-XII intact, grossly non-focal.  Psychiatric:  Alert and oriented, thought content appropriate, normal insight  Capillary Refill: Brisk,< 3 seconds   Peripheral Pulses: +2 palpable, equal bilaterally       Labs:   Recent Labs     07/12/20 2015 07/13/20 0438   WBC 3.9* 2.9*   HGB 11.7* 10.8*   HCT 34.9* 31.6*   PLT 72* 50*     Recent Labs     07/12/20 2015 07/13/20 0438   * 130*   K 4.9 4.5   CL 95* 96*   CO2 24 24   BUN 15 15   CREATININE 1.4* 1.5*   CALCIUM 7.8* 7.9*     Recent Labs     07/12/20 2015 07/13/20 0438   AST 51* 43*   ALT 26 21   BILITOT 0.9 1.0   ALKPHOS 195* 155*     Recent Labs     07/12/20 2015   INR 1.34*     No results for input(s): Janet Formosa in the last 72 hours.     Urinalysis:      Lab Results   Component Value Date    NITRU Negative 07/12/2020    WBCUA None seen 07/12/2020    RBCUA 3-4 07/12/2020    BLOODU MODERATE 07/12/2020    SPECGRAV 1.010 07/12/2020    GLUCOSEU 250 07/12/2020       Radiology:  XR CHEST PORTABLE   Final Result      No acute radiographic abnormality of the chest.              Assessment/Plan:    Active Hospital Problems    Diagnosis    Hyponatremia [E87.1]     Bilateral lower extremity edema  Hypoalbuminemia  Liver cirrhosis secondary to hepatitis C  Acute kidney injury  Chronic thrombocytopenia  Chronic leukopenia  Hyponatremia  Type 2 diabetes mellitus     Plan:  -s/p albumin x1  -Nephrology has been consulted for hyponatremia/KONG/lower extremity edema  -GI consult pending  -Continue insulin sliding scale, glargine  -Continue lactulose, Xifaxan  -Continue gabapentin  -Continue propranolol    DVT Prophylaxis:SCD  Diet: DIET LOW SODIUM 2 GM;  Code Status: Full Code    Dispo - pending clinical course    Lexie Veronica MD
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
## 2020-07-13 NOTE — PROGRESS NOTES
Pt allowed RN to place one thigh high compression stocking on LLE, refused right. Will continue to monitor.  Electronically signed by Marija Friedman RN on 7/13/2020 at 4:12 PM

## 2020-07-13 NOTE — PLAN OF CARE
Problem: Falls - Risk of:  Goal: Will remain free from falls  Description: Will remain free from falls  Outcome: Ongoing  Note: Pt is A&Ox4, is high fall risk, Pt educated and encouraged to use call light to notify and wait for assistance from staff before getting OOB, pt uses call light appropriately, has made no unsafe movements, no attempts to get OOB without assistance. Pt's bed alarm remained on throughout shift, bed in lowest position, 2/4 side rails up, call light and bedside table within reach. No falls so far this shift, will continue to monitor. Goal: Absence of physical injury  Description: Absence of physical injury  Outcome: Ongoing     Problem: Pain:  Description: Pain management should include both nonpharmacologic and pharmacologic interventions. Goal: Pain level will decrease  Description: Pain level will decrease  Outcome: Ongoing  Note: Pt c/o pain in BLE. Pain has been managed with prn oxycodone. Pt has been satisfied with pain control. Will continue to monitor.   Goal: Control of acute pain  Description: Control of acute pain  Outcome: Ongoing  Goal: Control of chronic pain  Description: Control of chronic pain  Outcome: Ongoing
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
## 2020-07-13 NOTE — PLAN OF CARE
Problem: Falls - Risk of:  Goal: Will remain free from falls  Description: Will remain free from falls  Outcome: Ongoing  Note: Patient has been up walking around with no weakness or dizziness. Patient has socks on and bed in lowest position. Will continue to monitor. Problem: Pain:  Goal: Pain level will decrease  Description: Pain level will decrease  Outcome: Ongoing  Note: Patient has stated no pain since being on unit. Patient was given oxycodone in ER and has helped. Will continue to monitor.

## 2020-07-14 VITALS
WEIGHT: 165 LBS | HEART RATE: 55 BPM | DIASTOLIC BLOOD PRESSURE: 76 MMHG | TEMPERATURE: 97.7 F | OXYGEN SATURATION: 95 % | BODY MASS INDEX: 23.1 KG/M2 | HEIGHT: 71 IN | RESPIRATION RATE: 16 BRPM | SYSTOLIC BLOOD PRESSURE: 124 MMHG

## 2020-07-14 LAB
ALBUMIN SERPL-MCNC: 3.2 G/DL (ref 3.4–5)
ANION GAP SERPL CALCULATED.3IONS-SCNC: 5 MMOL/L (ref 3–16)
BUN BLDV-MCNC: 12 MG/DL (ref 7–20)
CALCIUM SERPL-MCNC: 8.2 MG/DL (ref 8.3–10.6)
CHLORIDE BLD-SCNC: 106 MMOL/L (ref 99–110)
CO2: 26 MMOL/L (ref 21–32)
CREAT SERPL-MCNC: 1.2 MG/DL (ref 0.9–1.3)
GFR AFRICAN AMERICAN: >60
GFR NON-AFRICAN AMERICAN: >60
GLUCOSE BLD-MCNC: 79 MG/DL (ref 70–99)
GLUCOSE BLD-MCNC: 91 MG/DL (ref 70–99)
PERFORMED ON: NORMAL
PHOSPHORUS: 3.3 MG/DL (ref 2.5–4.9)
POTASSIUM SERPL-SCNC: 4 MMOL/L (ref 3.5–5.1)
SODIUM BLD-SCNC: 137 MMOL/L (ref 136–145)

## 2020-07-14 PROCEDURE — 6370000000 HC RX 637 (ALT 250 FOR IP): Performed by: INTERNAL MEDICINE

## 2020-07-14 PROCEDURE — 6360000002 HC RX W HCPCS: Performed by: INTERNAL MEDICINE

## 2020-07-14 PROCEDURE — 2580000003 HC RX 258: Performed by: INTERNAL MEDICINE

## 2020-07-14 PROCEDURE — 96366 THER/PROPH/DIAG IV INF ADDON: CPT

## 2020-07-14 PROCEDURE — G0378 HOSPITAL OBSERVATION PER HR: HCPCS

## 2020-07-14 PROCEDURE — 80069 RENAL FUNCTION PANEL: CPT

## 2020-07-14 PROCEDURE — 36415 COLL VENOUS BLD VENIPUNCTURE: CPT

## 2020-07-14 PROCEDURE — P9047 ALBUMIN (HUMAN), 25%, 50ML: HCPCS | Performed by: INTERNAL MEDICINE

## 2020-07-14 RX ADMIN — ALBUMIN (HUMAN) 25 G: 0.25 INJECTION, SOLUTION INTRAVENOUS at 02:32

## 2020-07-14 RX ADMIN — OXYCODONE 10 MG: 5 TABLET ORAL at 03:41

## 2020-07-14 RX ADMIN — ALBUMIN (HUMAN) 25 G: 0.25 INJECTION, SOLUTION INTRAVENOUS at 09:04

## 2020-07-14 RX ADMIN — Medication 10 ML: at 09:05

## 2020-07-14 RX ADMIN — METHADONE HYDROCHLORIDE 10 MG: 5 TABLET ORAL at 09:04

## 2020-07-14 RX ADMIN — LACTULOSE 20 G: 20 SOLUTION ORAL at 09:04

## 2020-07-14 RX ADMIN — RIFAXIMIN 550 MG: 550 TABLET ORAL at 09:04

## 2020-07-14 RX ADMIN — GABAPENTIN 300 MG: 300 CAPSULE ORAL at 09:04

## 2020-07-14 ASSESSMENT — PAIN SCALES - GENERAL
PAINLEVEL_OUTOF10: 7
PAINLEVEL_OUTOF10: 3
PAINLEVEL_OUTOF10: 6

## 2020-07-14 ASSESSMENT — PAIN DESCRIPTION - ONSET
ONSET: ON-GOING
ONSET: ON-GOING

## 2020-07-14 ASSESSMENT — PAIN DESCRIPTION - DESCRIPTORS
DESCRIPTORS: ACHING
DESCRIPTORS: ACHING

## 2020-07-14 ASSESSMENT — PAIN DESCRIPTION - LOCATION
LOCATION: LEG
LOCATION: BACK

## 2020-07-14 ASSESSMENT — PAIN DESCRIPTION - FREQUENCY
FREQUENCY: CONTINUOUS
FREQUENCY: CONTINUOUS

## 2020-07-14 ASSESSMENT — PAIN DESCRIPTION - PAIN TYPE
TYPE: ACUTE PAIN
TYPE: CHRONIC PAIN

## 2020-07-14 ASSESSMENT — PAIN DESCRIPTION - ORIENTATION: ORIENTATION: RIGHT;LEFT

## 2020-07-14 ASSESSMENT — PAIN - FUNCTIONAL ASSESSMENT: PAIN_FUNCTIONAL_ASSESSMENT: ACTIVITIES ARE NOT PREVENTED

## 2020-07-14 ASSESSMENT — PAIN DESCRIPTION - PROGRESSION
CLINICAL_PROGRESSION: NOT CHANGED
CLINICAL_PROGRESSION: NOT CHANGED
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
## 2020-07-14 NOTE — PLAN OF CARE
Problem: Falls - Risk of:  Goal: Will remain free from falls  Description: Will remain free from falls  Outcome: Ongoing  Note: Patient is up as tolerated. Patient calls out appropriately. Nonskid socks in place. Patient has call light within reach. Patient frequently rounded on. Will continue to monitor. Problem: Pain:  Goal: Pain level will decrease  Description: Pain level will decrease  Outcome: Ongoing  Note: Patient denies any pain at this time. Will continue to monitor.

## 2020-07-14 NOTE — PROGRESS NOTES
Discharge order received. Patient informed of discharge order. Discharge instructions reviewed with patient. Copy of discharge instructions given to patient. Patient verbalized understanding, denies needs or questions at this time. IV and telemetry removed. All patient belongings packed and sent with patient upon discharge. Patient left in wheelchair to car for transportation to home.

## 2020-07-14 NOTE — PROGRESS NOTES
Pt is alert and oriented, VSS. C/o chronic back and BLE pain, PRNs given w benefit. Tolerating diet. Urinating freely, BM this shift. Up ad gonzalo w steady gate. Now resting in bed, call light within reach.

## 2020-07-14 NOTE — DISCHARGE SUMMARY
Hospitalist Discharge Summary    Patient ID:  Jose Antonio Singh  [id number]  89 y.o.  1960    Admit date: 7/12/2020    Discharge date: 7/14/2020    Disposition: home    Admission Diagnoses:   Patient Active Problem List   Diagnosis    Multifocal pneumonia    Hyponatremia    Hepatitis C infection    Shortness of breath    Pleural effusion    [facility] [address]    Hepatic encephalopathy ([facility] [address]       Discharge Diagnoses: Active Problems:    Hyponatremia  Resolved Problems:    * No resolved hospital problems. *      Code Status:  Full Code    Condition:  Stable    Discharge Diet: Diet:  DIET LOW SODIUM 2 GM;    PCP to do list: Follow for improvement of symptoms    Hospital Course: 56-year-old male with a past medical history of liver cirrhosis secondary to hepatitis C, diabetes mellitus, chronic, cytopenia presented to the hospital with complains of bilateral lower extremity swelling and weakness along with fatigue which have been getting worse over the past 4 to 5 days. Patient states that he usually takes 2 diuretics and has had been feeling very fatigued as ordered that has also been down. Admission to the hospital patient was noted to be hemodynamically stable. His lab work showed mild AK I with creatinine of 1.4 and hyponatremia with sodium 127. Patient was admitted to the hospital and nephrology was consulted. Patient was given IV fluids along with albumin which improved his sodium back to normal and his creatinine came back to normal as well. Once nephrology cleared the patient was discharged home in stable condition.       Discharge Medications:   Discharge Medication List as of 7/14/2020 10:53 AM        Discharge Medication List as of 7/14/2020 10:53 AM        Discharge Medication List as of 7/14/2020 10:53 AM      CONTINUE these medications which have NOT CHANGED    Details   rifaximin (XIFAXAN) 550 MG tablet Take 1 tablet by mouth 2 times daily, Disp-42 tablet, R-3Print      lactulose (CHRONULAC) 10 GM/15ML solution Take 30 mLs by mouth 3 times daily, Disp-1 Bottle, R-3Print      furosemide (LASIX) 20 MG tablet Take 20 mg by mouth 2 times daily Historical Med      spironolactone (ALDACTONE) 50 MG tablet Take 50 mg by mouth dailyHistorical Med      pantoprazole (PROTONIX) 40 MG tablet Take 40 mg by mouth 2 times daily Historical Med      propranolol (INDERAL) 10 MG tablet Take 10 mg by mouth 3 times daily Historical Med      hydrOXYzine (ATARAX) 25 MG tablet Take 25 mg by mouth every 6 hours as needed for Itching or AnxietyHistorical Med      oxyCODONE HCl (OXY-IR) 10 MG immediate release tablet Take 10 mg by mouth every 6 hours as needed for Pain. Historical Med      gabapentin (NEURONTIN) 300 MG capsule Take 300 mg by mouth daily. Historical Med      methadone (DOLOPHINE) 10 MG tablet Take 10 mg by mouth 2 times daily            Discharge Medication List as of 7/14/2020 10:53 AM      STOP taking these medications       sennosides-docusate sodium (SENOKOT-S) 8.6-50 MG tablet Comments:   Reason for Stopping:         ergocalciferol (ERGOCALCIFEROL) 1.25 MG (78614 UT) capsule Comments:   Reason for Stopping:                   Procedures: none    Assessment on Discharge: Stable, improved     Discharge ROS:  A complete review of systems was asked and negative except for none    Discharge Exam:  /76   Pulse 55   Temp 97.7 °F (36.5 °C) (Oral)   Resp 16   Ht 5' 11\" (1.803 m)   Wt 165 lb (74.8 kg)   SpO2 95%   BMI 23.01 kg/m²        General appearance:  No apparent distress, appears stated age and cooperative. HEENT:  Normal cephalic, atraumatic without obvious deformity. Pupils equal, round, and reactive to light.  Extra ocular muscles intact. Conjunctivae/corneas clear. Neck: Supple, with full range of motion. No jugular venous distention. Trachea midline. Respiratory:  Normal respiratory effort.  Clear to auscultation, bilaterally without mg/dL    GFR Non-African American >60 >60    GFR African American >60 >60    Calcium 8.2 (L) 8.3 - 10.6 mg/dL    Phosphorus 3.3 2.5 - 4.9 mg/dL    Alb 3.2 (L) 3.4 - 5.0 g/dL   POCT Glucose    Collection Time: 07/14/20  7:23 AM   Result Value Ref Range    POC Glucose 79 70 - 99 mg/dl    Performed on ACCU-CHEK          Follow up: with James Neevs    Note that over 30 minutes was spent in preparing discharge papers, discussing discharge with patient, medication review, etc.    Thank you James Neves for the opportunity to be involved in this patient's care. If you have any questions or concerns please feel free to contact me at [phone].     Electronically signed by Lexie Veronica MD on 7/14/2020 at 11:43 AM

## 2020-07-14 NOTE — PROGRESS NOTES
Patient alert and oriented x4. VSS. Patient denies any pain at this time. Patient is up as tolerated. Patient is getting his last dose of albumin. Patient voiding adequately. Patient has family at bedside. Will continue to frequently round on patient.

## 2020-07-14 NOTE — CARE COORDINATION
Case Management Assessment            Discharge Note                    Date / Time of Note: 7/14/2020 12:52 PM                  Discharge Note Completed by: Hayley Mikachuckie    Patient Name: Carol Borjas   YOB: 1960  Diagnosis: Hyponatremia [E87.1]  Hyponatremia [E87.1]   Date / Time: 7/12/2020  7:06 PM    Current PCP: Boaz Flynn patient: No    Hospitalization in the last 30 days: No    Advance Directives:  Code Status: Full Code  [place] DNR form completed and on chart: No    Financial:  Payor: Vince Ames / Plan: Nguyen Solo / Product Type: *No Product type* /      Pharmacy:    [place] [address] [place] [phone] - F [phone]  [address] [facility] [address]  Phone: [phone] Fax: [address] [facility] [address] [place]  [phone] [place]elia Maciasly [phone]  [address] [facility]. [address] [address]  Phone: [phone] Fax: [phone]      Assistance purchasing medications?: Potential Assistance Purchasing Medications: No  Assistance provided by Case Management: None at this time    Does patient want to participate in local refill/ meds to beds program?: No    Meds To Beds General Rules:  1. Can ONLY be done Monday- Friday between 8:30am-5pm  2. Prescription(s) must be in pharmacy by 3pm to be filled same day  3. Copy of patient's insurance/ prescription drug card and patient face sheet must be sent along with the prescription(s)  4. Cost of Rx cannot be added to hospital bill. If financial assistance is needed, please contact unit  or ;  or  CANNOT provide pharmacy voucher for patients co-pays  5.  Patients can then  the prescription on their way out of the hospital at discharge, or pharmacy can deliver to the bedside if staff is available. (payment due at time of pick-up or delivery - cash, check, or card accepted) Able to afford home medications/ co-pay costs: Yes    ADLS:  Current PT AM-PAC Score:   /24  Current OT AM-PAC Score:   /24      DISCHARGE Disposition: Home- No Services Needed    LOC at discharge: Not Applicable  ABDIEL Completed: Not Indicated    Notification completed in HENS/PAS?:  Not Applicable    IMM Completed:   Not Indicated    Transportation:  Transportation PLAN for discharge: family   Mode of Transport: [address] ordered at discharge: No  [address]: Not Applicable  Orders faxed: No    Durable Medical Equipment:  DME Provider: none  Equipment obtained during hospitalization:     Home Oxygen and Respiratory Equipment:  Oxygen needed at discharge?: Not [address]: Not Applicable  Portable tank available for discharge?: Not Indicated    Dialysis:  Dialysis patient: No    Dialysis Center:  Not Applicable      Additional CM Notes: Pt from home will return no needs    The Plan for Transition of Care is related to the following treatment goals of Hyponatremia [E87.1]  Hyponatremia [E87.1]    The Patient and/or patient representative Lainey Gamble and his family were provided with a choice of provider and agrees with the discharge plan Yes    Freedom of choice list was provided with basic dialogue that supports the patient's individualized plan of care/goals and shares the quality data associated with the providers.  Not Indicated    Care Transitions patient: No    Chichi Miller RN  [facility] [employer]  Case Management Department  Ph: [phone]  Fax: [phone]